# Patient Record
Sex: MALE | NOT HISPANIC OR LATINO | Employment: FULL TIME | URBAN - NONMETROPOLITAN AREA
[De-identification: names, ages, dates, MRNs, and addresses within clinical notes are randomized per-mention and may not be internally consistent; named-entity substitution may affect disease eponyms.]

---

## 2019-06-21 ENCOUNTER — OFFICE VISIT (OUTPATIENT)
Dept: PHARMACY | Facility: HOSPITAL | Age: 24
End: 2019-06-21

## 2019-06-21 VITALS
SYSTOLIC BLOOD PRESSURE: 112 MMHG | HEART RATE: 55 BPM | OXYGEN SATURATION: 100 % | WEIGHT: 182.4 LBS | BODY MASS INDEX: 30.39 KG/M2 | DIASTOLIC BLOOD PRESSURE: 70 MMHG | HEIGHT: 65 IN

## 2019-06-21 DIAGNOSIS — B18.2 CHRONIC HEPATITIS C WITHOUT HEPATIC COMA (HCC): Primary | ICD-10-CM

## 2019-06-21 DIAGNOSIS — F19.91 HISTORY OF DRUG USE: ICD-10-CM

## 2019-06-21 LAB
ALBUMIN SERPL-MCNC: 4.8 G/DL (ref 3.5–5.2)
ALBUMIN/GLOB SERPL: 2.1 G/DL
ALP SERPL-CCNC: 62 U/L (ref 39–117)
ALPHA-FETOPROTEIN: 2.61 NG/ML (ref 0–8.3)
ALT SERPL W P-5'-P-CCNC: 109 U/L (ref 1–41)
ANION GAP SERPL CALCULATED.3IONS-SCNC: 14.7 MMOL/L
AST SERPL-CCNC: 58 U/L (ref 1–40)
BILIRUB SERPL-MCNC: 0.5 MG/DL (ref 0.2–1.2)
BUN BLD-MCNC: 12 MG/DL (ref 6–20)
BUN/CREAT SERPL: 17.1 (ref 7–25)
CALCIUM SPEC-SCNC: 9.3 MG/DL (ref 8.6–10.5)
CHLORIDE SERPL-SCNC: 98 MMOL/L (ref 98–107)
CO2 SERPL-SCNC: 28.3 MMOL/L (ref 22–29)
CREAT BLD-MCNC: 0.7 MG/DL (ref 0.76–1.27)
DEPRECATED RDW RBC AUTO: 38.7 FL (ref 37–54)
ERYTHROCYTE [DISTWIDTH] IN BLOOD BY AUTOMATED COUNT: 11.4 % (ref 12.3–15.4)
GFR SERPL CREATININE-BSD FRML MDRD: 136 ML/MIN/1.73
GFR SERPL CREATININE-BSD FRML MDRD: >150 ML/MIN/1.73
GLOBULIN UR ELPH-MCNC: 2.3 GM/DL
GLUCOSE BLD-MCNC: 77 MG/DL (ref 65–99)
HBV SURFACE AB SER RIA-ACNC: NORMAL
HCT VFR BLD AUTO: 47.9 % (ref 37.5–51)
HGB BLD-MCNC: 15.4 G/DL (ref 13–17.7)
INR PPP: 0.91 (ref 0.9–1.1)
MCH RBC QN AUTO: 29.6 PG (ref 26.6–33)
MCHC RBC AUTO-ENTMCNC: 32.2 G/DL (ref 31.5–35.7)
MCV RBC AUTO: 92.1 FL (ref 79–97)
PLATELET # BLD AUTO: 217 10*3/MM3 (ref 140–450)
PMV BLD AUTO: 10.8 FL (ref 6–12)
POTASSIUM BLD-SCNC: 4.4 MMOL/L (ref 3.5–5.2)
PROT SERPL-MCNC: 7.1 G/DL (ref 6–8.5)
PROTHROMBIN TIME: 12.8 SECONDS (ref 11–15.4)
RBC # BLD AUTO: 5.2 10*6/MM3 (ref 4.14–5.8)
SODIUM BLD-SCNC: 141 MMOL/L (ref 136–145)
WBC NRBC COR # BLD: 7.97 10*3/MM3 (ref 3.4–10.8)

## 2019-06-21 PROCEDURE — 86704 HEP B CORE ANTIBODY TOTAL: CPT

## 2019-06-21 PROCEDURE — 85027 COMPLETE CBC AUTOMATED: CPT

## 2019-06-21 PROCEDURE — 87902 NFCT AGT GNTYP ALYS HEP C: CPT

## 2019-06-21 PROCEDURE — 80307 DRUG TEST PRSMV CHEM ANLYZR: CPT

## 2019-06-21 PROCEDURE — 36415 COLL VENOUS BLD VENIPUNCTURE: CPT

## 2019-06-21 PROCEDURE — 86706 HEP B SURFACE ANTIBODY: CPT

## 2019-06-21 PROCEDURE — 99243 OFF/OP CNSLTJ NEW/EST LOW 30: CPT | Performed by: PHYSICIAN ASSISTANT

## 2019-06-21 PROCEDURE — 80053 COMPREHEN METABOLIC PANEL: CPT

## 2019-06-21 PROCEDURE — G0483 DRUG TEST DEF 22+ CLASSES: HCPCS

## 2019-06-21 PROCEDURE — 86708 HEPATITIS A ANTIBODY: CPT

## 2019-06-21 PROCEDURE — 82105 ALPHA-FETOPROTEIN SERUM: CPT

## 2019-06-21 PROCEDURE — 85610 PROTHROMBIN TIME: CPT

## 2019-06-21 PROCEDURE — 81596 NFCT DS CHRNC HCV 6 ASSAYS: CPT

## 2019-06-21 PROCEDURE — 87522 HEPATITIS C REVRS TRNSCRPJ: CPT

## 2019-06-21 NOTE — PROGRESS NOTES
: 1995    Chief Complaint   Patient presents with   • Hepatitis C       Thien Covarrubias is a 24 y.o. male who presents to the office today as a consultation from Oli PATTERSON MD for evaluation of Hepatitis C.    History of Present Illness:  He reports recently finding out about having Hepatitis C infection. He is unsure how long he has had it. He has not used IV drugs for 9 years. He had a needle stick from another drug user even after he stopped using drugs. No current illicit drug use including marijuana. He has not smoked marijuana in over 10 years. No current alcohol use and no previous daily alcohol use. He does have a non-professional tattoo. His father had Hep C and was cured but no known family history of cirrhosis or liver disease. No recent liver imaging. He recently had dental procedure approx 1 week ago in [place] and his dentist gave hydrocodone for pain after. He feels that the left side of his face is larger than the right but this has been present for years.     Labs 2019:  Acute hepatitis panel pos Hep C only  AST 46  ALT 91  HIV negative   Cholesterol normal  Plt 250,000  H/H normal  GFR normal  TSH normal    Review of Systems   Constitutional: Negative for chills, fatigue and fever.   HENT: Negative for trouble swallowing.    Eyes: Negative.    Respiratory: Negative for cough, choking, chest tightness and shortness of breath.    Cardiovascular: Positive for chest pain.   Gastrointestinal: Negative for abdominal distention, abdominal pain, anal bleeding, blood in stool, constipation, diarrhea, nausea and vomiting.   Endocrine: Negative.    Genitourinary: Negative for difficulty urinating.   Musculoskeletal: Negative for back pain and neck pain.   Skin: Positive for rash. Negative for color change, pallor and wound.   Allergic/Immunologic: Negative for environmental allergies and food allergies.   Neurological: Negative for dizziness, light-headedness and headaches.  "  Hematological: Does not bruise/bleed easily.   Psychiatric/Behavioral: The patient is nervous/anxious.        Past Medical History:   Diagnosis Date   • Infectious viral hepatitis        Past Surgical History:   Procedure Laterality Date   • TONSILLECTOMY AND ADENOIDECTOMY         Family History   Problem Relation Age of Onset   • Hearing loss Father        Social History     Socioeconomic History   • Marital status: Unknown     Spouse name: Not on file   • Number of children: Not on file   • Years of education: Not on file   • Highest education level: Not on file   Tobacco Use   • Smoking status: Never Smoker   • Smokeless tobacco: Former User   Substance and Sexual Activity   • Alcohol use: No     Frequency: Never   • Drug use: No   • Sexual activity: Defer     Current Medications:  None    Allergies:   Patient has no known allergies.    Vitals:  /70 (BP Location: Left arm, Patient Position: Sitting, Cuff Size: Adult)   Pulse 55   Ht 165.1 cm (65\")   Wt 82.7 kg (182 lb 6.4 oz)   SpO2 100%   BMI 30.35 kg/m²     Physical Exam   Constitutional: He is oriented to person, place, and time. He appears well-developed and well-nourished. No distress.   HENT:   Head: Normocephalic and atraumatic.   Nose: Nose normal.   Mouth/Throat: Oropharynx is clear and moist.   Left side of face > right. Poor dentition.   Eyes: Conjunctivae are normal. Right eye exhibits no discharge. Left eye exhibits no discharge. No scleral icterus.   Neck: Normal range of motion. No JVD present.   Cardiovascular: Normal rate, regular rhythm and normal heart sounds. Exam reveals no gallop and no friction rub.   No murmur heard.  Pulmonary/Chest: Effort normal and breath sounds normal. No respiratory distress. He has no wheezes. He has no rales. He exhibits no tenderness.   Abdominal: Soft. Bowel sounds are normal. He exhibits no mass. There is no tenderness.   ticklish   Musculoskeletal: Normal range of motion. He exhibits no edema or " deformity.   Neurological: He is alert and oriented to person, place, and time. Coordination normal.   Skin: Skin is warm and dry. No rash noted. He is not diaphoretic. No erythema.   Psychiatric: He has a normal mood and affect. His behavior is normal. Judgment and thought content normal.   Vitals reviewed.    Assessment/Plan:  1. Chronic hepatitis C without hepatic coma (CMS/HCC)    2. History of drug use      Orders Placed This Encounter   Procedures   • US Liver   • AFP Tumor Marker   • CBC (No Diff)   • Comprehensive Metabolic Panel   • HCV FibroSURE   • HCV RNA By PCR, Qn Rfx Kae   • Hepatitis A Antibody, Total   • Hepatitis B Core Antibody, Total   • Hepatitis B Surface Antibody   • Protime-INR   • Compliance Drug Analysis, Ur - Urine, Clean Catch     More recommendations will be made after these results have been reviewed. He will continue to abstain from alcohol and illegal drugs. He will have US liver to determine if any lesions or other liver diseases present. Immunity to Hep A and B will be determined and vaccinations recommended if needed. If drug and alcohol screen negative, then we will proceed with Hep C therapy and will submit Rx to insurance company for approval. Treatment will depend on fibrosis score and Hep C genotype. When approved, he will  Rx from our pharmacy and have another appointment with me. Hep C viral load lab will be checked 4 weeks after start of therapy, at end of therapy and 3 months s/p therapy to determine response to treatment and cure. He will call with concerns.     [facility] reviewed and confirmed opioid script from dentist recently.           Follow up will be dependent on lab results.       Electronically signed 6/21/2019 1:36 PM  Namrata Webb PA-C, [facility]

## 2019-06-22 LAB
HAV AB SER QL IA: POSITIVE
HBV CORE AB SER DONR QL IA: NEGATIVE

## 2019-06-23 LAB
HCV GENTYP SERPL NAA+PROBE: NORMAL
HCV GENTYP SERPL NAA+PROBE: NORMAL
HCV RNA SERPL NAA+PROBE-ACNC: NORMAL IU/ML
HCV RNA SERPL NAA+PROBE-LOG IU: 5.9 LOG10 IU/ML
Lab: NORMAL
REF LAB TEST REF RANGE: NORMAL

## 2019-06-25 LAB
A2 MACROGLOB SERPL-MCNC: 149 MG/DL (ref 110–276)
ALT SERPL W P-5'-P-CCNC: 129 IU/L (ref 0–55)
APO A-I SERPL-MCNC: 136 MG/DL (ref 101–178)
BILIRUB SERPL-MCNC: 0.4 MG/DL (ref 0–1.2)
FIBROSIS SCORING:: ABNORMAL
FIBROSIS STAGE SERPL QL: ABNORMAL
GGT SERPL-CCNC: 54 IU/L (ref 0–65)
HAPTOGLOB SERPL-MCNC: 103 MG/DL (ref 34–200)
HCV AB SER QL: ABNORMAL
LABORATORY COMMENT REPORT: ABNORMAL
LIMITATIONS:: ABNORMAL
LIVER FIBR SCORE SERPL CALC.FIBROSURE: 0.11 (ref 0–0.21)
NECROINFLAMM ACTIVITY SCORING:: ABNORMAL
NECROINFLAMMATORY ACT GRADE SERPL QL: ABNORMAL
NECROINFLAMMATORY ACT SCORE SERPL: 0.6 (ref 0–0.17)

## 2019-06-27 LAB — CONV REPORT SUMMARY: NORMAL

## 2019-06-28 ENCOUNTER — TELEPHONE (OUTPATIENT)
Dept: GASTROENTEROLOGY | Facility: CLINIC | Age: 24
End: 2019-06-28

## 2019-06-28 ENCOUNTER — HOSPITAL ENCOUNTER (OUTPATIENT)
Dept: ULTRASOUND IMAGING | Facility: HOSPITAL | Age: 24
Discharge: HOME | End: 2019-06-28
Admitting: PHYSICIAN ASSISTANT

## 2019-06-28 DIAGNOSIS — F19.91 HISTORY OF DRUG USE: ICD-10-CM

## 2019-06-28 DIAGNOSIS — B18.2 CHRONIC HEPATITIS C WITHOUT HEPATIC COMA (HCC): ICD-10-CM

## 2019-06-28 PROCEDURE — 76705 ECHO EXAM OF ABDOMEN: CPT | Performed by: RADIOLOGY

## 2019-06-28 PROCEDURE — 76705 ECHO EXAM OF ABDOMEN: CPT

## 2019-07-19 ENCOUNTER — TELEPHONE (OUTPATIENT)
Dept: GASTROENTEROLOGY | Facility: CLINIC | Age: 24
End: 2019-07-19

## 2019-07-19 ENCOUNTER — OFFICE VISIT (OUTPATIENT)
Dept: PHARMACY | Facility: HOSPITAL | Age: 24
End: 2019-07-19

## 2019-07-19 VITALS
HEIGHT: 68 IN | SYSTOLIC BLOOD PRESSURE: 108 MMHG | WEIGHT: 184 LBS | BODY MASS INDEX: 27.89 KG/M2 | HEART RATE: 57 BPM | RESPIRATION RATE: 18 BRPM | OXYGEN SATURATION: 98 % | DIASTOLIC BLOOD PRESSURE: 64 MMHG

## 2019-07-19 DIAGNOSIS — B18.2 CHRONIC HEPATITIS C WITHOUT HEPATIC COMA (HCC): Primary | ICD-10-CM

## 2019-07-19 DIAGNOSIS — Z23 NEED FOR HEPATITIS B VACCINATION: ICD-10-CM

## 2019-07-19 PROCEDURE — 99214 OFFICE O/P EST MOD 30 MIN: CPT | Performed by: PHYSICIAN ASSISTANT

## 2019-07-19 NOTE — TELEPHONE ENCOUNTER
Pt has Hep C, genotype 1a, treatment naive, without cirrhosis (F0 fibrosis score). He needs Hep B vaccination, he will get this on his own. I have prescribed Mavyret for tx.

## 2019-07-19 NOTE — PROGRESS NOTES
: 1995    Chief Complaint   Patient presents with   • Hepatitis C       Thien Covarrubias is a 24 y.o. male who presents to the office today as a follow up appointment regarding Hepatitis C.    History of Present Illness:  He denies any new problems since recent office visit. He had labs and imaging as ordered for consideration of Hep C therapy and would like to discuss those results today. He found out about having Hep C approx 2019. He is unsure how long he has had it. He has not used IV drugs for 9 years. He had a needle stick from another drug user even after he stopped using drugs. No current illicit drug use including marijuana. He has not smoked marijuana in over 10 years. No current alcohol use and no previous daily alcohol use. He does have a non-professional tattoo. His father had Hep C and was cured but no known family history of cirrhosis or liver disease. No recent liver imaging. He recently had dental procedure approx 1 month ago in [place] and his dentist gave hydrocodone for pain after. He feels that the left side of his face is larger than the right but this has been present for years.      Review of Systems   Constitutional: Negative for chills, fatigue and fever.   HENT: Negative for trouble swallowing.    Eyes: Negative.    Respiratory: Negative for cough, choking, chest tightness and shortness of breath.    Cardiovascular: Positive for chest pain.   Gastrointestinal: Negative for abdominal distention, abdominal pain, anal bleeding, blood in stool, constipation, diarrhea, nausea and vomiting.   Endocrine: Negative.    Genitourinary: Negative for difficulty urinating.   Musculoskeletal: Negative for back pain and neck pain.   Skin: Positive for rash. Negative for color change, pallor and wound.   Allergic/Immunologic: Negative for environmental allergies and food allergies.   Neurological: Negative for dizziness, light-headedness and headaches.   Hematological: Does not bruise/bleed  "easily.   Psychiatric/Behavioral: The patient is nervous/anxious.        Past Medical History:   Diagnosis Date   • Infectious viral hepatitis        Past Surgical History:   Procedure Laterality Date   • TONSILLECTOMY AND ADENOIDECTOMY         Family History   Problem Relation Age of Onset   • Hearing loss Father        Social History     Socioeconomic History   • Marital status: Single     Spouse name: Not on file   • Number of children: Not on file   • Years of education: Not on file   • Highest education level: Not on file   Tobacco Use   • Smoking status: Never Smoker   • Smokeless tobacco: Former User   Substance and Sexual Activity   • Alcohol use: No     Frequency: Never   • Drug use: No   • Sexual activity: Defer     Current Medications:  None    Allergies:   Patient has no known allergies.    Vitals:  /64 (BP Location: Right arm, Patient Position: Sitting)   Pulse 57   Resp 18   Ht 172.7 cm (68\")   Wt 83.5 kg (184 lb)   SpO2 98%   BMI 27.98 kg/m²     Physical Exam   Constitutional: He is oriented to person, place, and time. He appears well-developed and well-nourished. No distress.   HENT:   Head: Normocephalic and atraumatic.   Nose: Nose normal.   Mouth/Throat: Oropharynx is clear and moist.   Left side of face > right. Poor dentition.   Eyes: Conjunctivae are normal. Right eye exhibits no discharge. Left eye exhibits no discharge. No scleral icterus.   Neck: Normal range of motion. No JVD present.   Cardiovascular: Normal rate, regular rhythm and normal heart sounds. Exam reveals no gallop and no friction rub.   No murmur heard.  Pulmonary/Chest: Effort normal and breath sounds normal. No respiratory distress. He has no wheezes. He has no rales. He exhibits no tenderness.   Abdominal: Soft. Bowel sounds are normal. He exhibits no mass. There is no tenderness.   ticklish   Musculoskeletal: Normal range of motion. He exhibits no edema or deformity.   Neurological: He is alert and oriented to " person, place, and time. Coordination normal.   Skin: Skin is warm and dry. No rash noted. He is not diaphoretic. No erythema.   Psychiatric: He has a normal mood and affect. His behavior is normal. Judgment and thought content normal.   Vitals reviewed.    Results Review:  Ultrasound liver 6/28/2019 normal    Labs 6/21/2019: Hepatitis C genotype 1a, AFP normal, CBC normal, , AST 58, alk phos 62, total bilirubin 0.5, , creatinine 0.70, F0 fibrosis score, A2 inflammation, hep C viral load 790,000, hepatitis A total antibody positive, hepatitis B core total antibody negative, hepatitis B surface antibody negative, INR normal, drug screen negative.    Labs 5/2019:  Acute hepatitis panel pos Hep C only  AST 46  ALT 91  HIV negative   Cholesterol normal  Plt 250,000  H/H normal  GFR normal  TSH normal    Assessment/Plan:  1. Chronic hepatitis C without hepatic coma (CMS/HCC)    2. Need for hepatitis B vaccination      Will start treatment for hepatitis C, genotype 1a, treatment naïve without cirrhosis with Mavyret as directed.  Take this medication daily and not missed any doses.  He will call with any concerns.  He will have labs 4 weeks after starting medication to monitor response to therapy.  He will also have labs at completion of therapy and 3-month status post therapy for confirmation of cure.  Continue abstinence from alcohol and illicit drugs.    Series of immunizations for Hepatitis B should be obtained and have been ordered today. Immunity is important to prevent further insult to his liver. Information regarding locations that these can be performed has been expressed.    Rx- Mavyret, Glecaprevir-Pibrentasvir 100-MG tablet, tabs p.o. once daily.  Dispense 84 tablets.  Refills 1.        Follow up 4 weeks after starting Hep C therapy.      Electronically signed 7/31/2019 2:59 PM  Namrata Webb PA-C, [facility]

## 2019-07-22 ENCOUNTER — SPECIALTY PHARMACY (OUTPATIENT)
Dept: PHARMACY | Facility: HOSPITAL | Age: 24
End: 2019-07-22

## 2019-07-22 DIAGNOSIS — B18.2 CHRONIC HEPATITIS C WITHOUT HEPATIC COMA (HCC): ICD-10-CM

## 2019-07-22 RX ORDER — VELPATASVIR AND SOFOSBUVIR 100; 400 MG/1; MG/1
1 TABLET, FILM COATED ORAL DAILY
Qty: 28 TABLET | Refills: 2 | Status: SHIPPED | OUTPATIENT
Start: 2019-07-22 | End: 2019-09-16

## 2019-09-16 ENCOUNTER — OFFICE VISIT (OUTPATIENT)
Dept: UROLOGY | Facility: CLINIC | Age: 24
End: 2019-09-16

## 2019-09-16 VITALS
TEMPERATURE: 97.6 F | SYSTOLIC BLOOD PRESSURE: 118 MMHG | BODY MASS INDEX: 28.79 KG/M2 | HEIGHT: 68 IN | DIASTOLIC BLOOD PRESSURE: 68 MMHG | WEIGHT: 190 LBS

## 2019-09-16 DIAGNOSIS — Z98.52 VASECTOMY STATUS: Primary | ICD-10-CM

## 2019-09-16 PROCEDURE — 99203 OFFICE O/P NEW LOW 30 MIN: CPT | Performed by: UROLOGY

## 2019-09-16 RX ORDER — HYDROCODONE BITARTRATE AND ACETAMINOPHEN 5; 325 MG/1; MG/1
TABLET ORAL
Refills: 0 | COMMUNITY
Start: 2019-06-13 | End: 2019-09-16

## 2019-09-16 RX ORDER — DIAZEPAM 10 MG/1
TABLET ORAL
Qty: 2 TABLET | Refills: 0 | Status: SHIPPED | OUTPATIENT
Start: 2019-09-16 | End: 2019-11-18

## 2019-09-16 RX ORDER — CEPHALEXIN 500 MG/1
500 CAPSULE ORAL 2 TIMES DAILY
Qty: 8 CAPSULE | Refills: 0 | Status: SHIPPED | OUTPATIENT
Start: 2019-09-16 | End: 2019-09-20

## 2019-09-16 NOTE — PROGRESS NOTES
Chief Complaint:          Chief Complaint   Patient presents with   • Vasectomy Consult     New pt       HPI:   24 y.o. male.  Currently has 3 children and his wife is pregnant is desirous of an elective scrotal vasectomy he has no contraindications.  Patient presents for consideration of an elective scrotal vasectomy.  I discussed the procedure at length.  I discussed the fact that it has a risk of anesthesia, bleeding, infection, testicular pain postoperatively, a failure in the range of 1 in 10,000.  The important necessity to have a follow-up in about 8 weeks after the original procedure to be sure that the semen specimen is free of spermatozoa thus ensuring sterility.  I discussed the various forms out there including a 1 incision, 2 incision technique as well as the after extensive discussion they wish to proceed    Past Medical History:        Past Medical History:   Diagnosis Date   • Infectious viral hepatitis          Current Meds:     No current outpatient medications on file.     No current facility-administered medications for this visit.         Allergies:      No Known Allergies     Past Surgical History:     Past Surgical History:   Procedure Laterality Date   • TONSILLECTOMY AND ADENOIDECTOMY           Social History:     Social History     Socioeconomic History   • Marital status: Single     Spouse name: Not on file   • Number of children: Not on file   • Years of education: Not on file   • Highest education level: Not on file   Tobacco Use   • Smoking status: Never Smoker   • Smokeless tobacco: Former User   Substance and Sexual Activity   • Alcohol use: No     Frequency: Never   • Drug use: No   • Sexual activity: Defer       Family History:     Family History   Problem Relation Age of Onset   • Hearing loss Father    • No Known Problems Mother        Review of Systems:     Review of Systems   Constitutional: Negative.    HENT: Negative.    Eyes: Negative.    Respiratory: Negative.     Cardiovascular: Negative.    Gastrointestinal: Negative.    Endocrine: Negative.    Musculoskeletal: Negative.    Allergic/Immunologic: Negative.    Neurological: Negative.    Hematological: Negative.    Psychiatric/Behavioral: Negative.        Physical Exam:     Physical Exam   Constitutional: He is oriented to person, place, and time. He appears well-developed and well-nourished.   HENT:   Head: Normocephalic and atraumatic.   Eyes: Conjunctivae and EOM are normal. Pupils are equal, round, and reactive to light.   Neck: Normal range of motion.   Cardiovascular: Normal rate, regular rhythm, normal heart sounds and intact distal pulses.   Pulmonary/Chest: Effort normal and breath sounds normal.   Abdominal: Soft. Bowel sounds are normal.   Genitourinary: Penis normal.   Genitourinary Comments: Normal scrotal sac   Musculoskeletal: Normal range of motion.   Neurological: He is alert and oriented to person, place, and time. He has normal reflexes.   Skin: Skin is warm and dry.   Psychiatric: He has a normal mood and affect. His behavior is normal. Judgment and thought content normal.   Nursing note and vitals reviewed.      I have reviewed the following portions of the patient's history: allergies, current medications, past family history, past medical history, past social history, past surgical history, problem list and ROS and confirm it's accurate.      Procedure:       Assessment/Plan:   Vasectomy status-patient presents for consideration of an elective scrotal vasectomy.  I discussed the procedure at length.  I discussed the fact that it has a risk of anesthesia, bleeding, infection, testicular pain postoperatively, a failure in the range of 1 in 10,000.  The important necessity to have a follow-up in about 8 weeks after the original procedure to be sure that the semen specimen is free of spermatozoa thus ensuring sterility.  I discussed the various forms out there including a 1 incision, 2 incision technique  as well as the after extensive discussion they wish to proceed            Patient's Body mass index is 28.89 kg/m². BMI is above normal parameters. Recommendations include: educational material.              This document has been electronically signed by GEORGIA BECKHAM MD September 16, 2019 2:19 PM

## 2019-09-17 PROBLEM — Z98.52 VASECTOMY STATUS: Status: ACTIVE | Noted: 2019-09-17

## 2019-10-15 ENCOUNTER — PRIOR AUTHORIZATION (OUTPATIENT)
Dept: UROLOGY | Facility: CLINIC | Age: 24
End: 2019-10-15

## 2019-10-22 ENCOUNTER — OFFICE VISIT (OUTPATIENT)
Dept: UROLOGY | Facility: CLINIC | Age: 24
End: 2019-10-22

## 2019-10-22 VITALS
DIASTOLIC BLOOD PRESSURE: 68 MMHG | BODY MASS INDEX: 28.79 KG/M2 | SYSTOLIC BLOOD PRESSURE: 124 MMHG | WEIGHT: 190 LBS | HEIGHT: 68 IN

## 2019-10-22 DIAGNOSIS — Z98.52 VASECTOMY STATUS: Primary | ICD-10-CM

## 2019-10-22 PROCEDURE — 55250 REMOVAL OF SPERM DUCT(S): CPT | Performed by: UROLOGY

## 2019-10-22 RX ORDER — HYDROCODONE BITARTRATE AND ACETAMINOPHEN 10; 325 MG/1; MG/1
1 TABLET ORAL EVERY 6 HOURS PRN
Qty: 10 TABLET | Refills: 0 | Status: SHIPPED | OUTPATIENT
Start: 2019-10-22 | End: 2019-11-18

## 2019-10-22 NOTE — PROGRESS NOTES
Chief Complaint:          Chief Complaint   Patient presents with   • Sterilization       HPI:   24 y.o. male. Currently has 3 children and his wife is pregnant is desirous of an elective scrotal vasectomy he has no contraindications.  Patient presents for consideration of an elective scrotal vasectomy.  I discussed the procedure at length.  I discussed the fact that it has a risk of anesthesia, bleeding, infection, testicular pain postoperatively, a failure in the range of 1 in 10,000.  The important necessity to have a follow-up in about 8 weeks after the original procedure to be sure that the semen specimen is free of spermatozoa thus ensuring sterility.  I discussed the various forms out there including a 1 incision, 2 incision technique as well as the after extensive discussion they wish to proceed.      Past Medical History:        Past Medical History:   Diagnosis Date   • Infectious viral hepatitis          Current Meds:     Current Outpatient Medications   Medication Sig Dispense Refill   • diazePAM (VALIUM) 10 MG tablet Use one tablet night before procedure at bedtime and morning of prior to procedure 2 tablet 0     No current facility-administered medications for this visit.         Allergies:      No Known Allergies     Past Surgical History:     Past Surgical History:   Procedure Laterality Date   • TONSILLECTOMY AND ADENOIDECTOMY           Social History:     Social History     Socioeconomic History   • Marital status: Single     Spouse name: Not on file   • Number of children: Not on file   • Years of education: Not on file   • Highest education level: Not on file   Tobacco Use   • Smoking status: Never Smoker   • Smokeless tobacco: Current User     Types: Chew   Substance and Sexual Activity   • Alcohol use: No     Frequency: Never   • Drug use: No   • Sexual activity: Defer       Family History:     Family History   Problem Relation Age of Onset   • Hearing loss Father    • No Known Problems Mother         Review of Systems:     Review of Systems   Constitutional: Negative.    HENT: Negative.    Eyes: Negative.    Respiratory: Negative.    Cardiovascular: Negative.    Gastrointestinal: Negative.    Endocrine: Negative.    Musculoskeletal: Negative.    Allergic/Immunologic: Negative.    Neurological: Negative.    Hematological: Negative.    Psychiatric/Behavioral: Negative.        Physical Exam:     Physical Exam   Constitutional: He is oriented to person, place, and time. He appears well-developed and well-nourished.   HENT:   Head: Normocephalic and atraumatic.   Eyes: Conjunctivae and EOM are normal. Pupils are equal, round, and reactive to light.   Neck: Normal range of motion.   Cardiovascular: Normal rate, regular rhythm, normal heart sounds and intact distal pulses.   Pulmonary/Chest: Effort normal and breath sounds normal.   Abdominal: Soft. Bowel sounds are normal.   Musculoskeletal: Normal range of motion.   Neurological: He is alert and oriented to person, place, and time. He has normal reflexes.   Skin: Skin is warm and dry.   Psychiatric: He has a normal mood and affect. His behavior is normal. Judgment and thought content normal.   Nursing note and vitals reviewed.      I have reviewed the following portions of the patient's history: allergies, current medications, past family history, past medical history, past social history, past surgical history, problem list and ROS and confirm it's accurate.      Procedure:   Elective scrotal vasectomy -After an appropriate informed consent including the risk of anesthesia, infection, scrotal hematoma, failure in the range of 1 in 10,000, chronic testalgia,  low testosterone, as well as the other very rare complications identified.  He was brought to the operative suite his scrotum was shaved and prepped in a sterile fashion using Betadine.  Local anesthetic was infiltrated into the midline scrotal raphae. A midline scrotal incision was made and the right vas  entrapped.  I anesthetized the spermatic cord and skin using 5 cc of 1% Xylocaine with epinephrine after an adequate period of analgesia I  identified the vas and brought out into the incision.  The fascia was divided.  The vas was then doubly ligated fulgurated and the and was sewn in the sheath away from the proximal end.  Bovie electrocautery had been used to fulgurate the end of both vases as per the AUA guidelines Hemostasis was excellent and it was allowed to fall back in the scrotal sac the identical procedure was done on the contralateral side.  The skin was closed with a 3-0 chromic sutures.  Hemostasis was excellent he was recommended to use ice pack with a shield covering the scrotum to protect it from the ice.  He was given pain medication and antibiotics.  The incision was covered with bacitracin ointment.  The patient will be seen in 8 weeks whereupon we will then check a semen analysis to confirm the absence of spermatozoa and therby declare sterility.  Vasectomy status    Assessment/Plan:   Vasectomy status-await semen results  Narcotic pain medication-patient has significant acute pain that I believe would be an indication for the use of narcotic pain medication.  I discussed the significant risks of pain medication and the fact that this will be a short only option and I will give her no more than a three-day supply of pain medication.  And I will not plan long-term medication and that this will be sent to a pain clinic if at all becomes necessary.  We discussed signing a pain medication agreement and the fact that we're going to run a state [facility] review to be sure the patient is not getting pain medication from elsewhere.  If this is the case we will not give pain medication.  As part of the patient's treatment plan of there being prescribed a controlled substance with potential for abuse.  This patient has been wait aware of the appropriate dose of such medications including, the risk for  somnolence, limited ability to drive and/or safety and the significant potential for overdose.  It is been made clear that these medications are for the prescribed patient only without concomitant use of alcohol or other sepsis unless prescribed by the medical provider.  Has completed prescribing agreement detailing the terms of continue prescribing him a controlled substance.  Including monitoring [place] ports, the possibility of urine drug screens and pedal counts.  The patient is aware that we reviewed [facility] reports on a regular basis and scan them into the chart.  History and physical examination exhibited continued safe and appropriate use of controlled substances.  Also discussed the fact that the new [place] legislation allows only a three-day prescription for pain medication.  In this situation he will be referred to a chronic pain clinic.          Patient's Body mass index is 28.9 kg/m². BMI is above normal parameters. Recommendations include: educational material.              This document has been electronically signed by GEORGIA BECKHAM MD October 22, 2019 8:30 AM

## 2019-10-22 NOTE — PATIENT INSTRUCTIONS
Vasectomy, Care After  Refer to this sheet in the next few weeks. These instructions provide you with information on caring for yourself after your procedure. Your health care provider may also give you more specific instructions. Your treatment has been planned according to current medical practices, but problems sometimes occur. Call your health care provider if you have any problems or questions after your procedure.  What can I expect after the procedure?  After your procedure, it is typical to have the following:  · Slight swelling or redness or both at the surgical site.  · Mild pain or discomfort in the scrotum.  · Some oozing of blood from the cuts (incisions) made by the surgeon is normal during the first day or two after the procedure.  · Blood in the ejaculate is common and typically clears after a few days.    Follow these instructions at home:  · Only take over-the-counter or prescription medicines for pain, discomfort, or fever as directed by your health care provider.  · Avoid using nonsteroidal anti-inflammatory drugs (NSAIDs) because these can make bleeding worse.  · Apply ice to the injured area:  ? Put ice in a plastic bag.  ? Place a towel between your skin and the bag.  ? Leave the ice on for 20 minutes, 2-3 times a day.  · Avoid being active for the first 2 days after surgery.  · Wear a supporter while moving around for the first week after surgery. You may add some sterile fluffed bandages or a clean washcloth to the scrotal support if the scrotal support irritates your skin.  · Do not participate in sports or perform heavy physical labor for at least 2 weeks.  · You may have protected intercourse 7-10 days after your procedure. Remember, you are not sterile until follow-up specimens show no sperm in your ejaculate.  · Be sure to follow up with your surgeon as instructed to confirm sterility. It usually requires multiple ejaculations to clear the sperm located beyond the vasectomy site of  blockage. You will need at least two specimens showing an absence of sperm before you can resume unprotected intercourse.  Contact a health care provider if:  · You have redness, swelling, or increasing pain in the wounds or testicles (scrotum).  · You see pus coming from the wound.  · You have a fever.  · You notice a foul smell coming from the wound or dressing.  · You notice a breaking open of the stitches (suture) line or wound edges even after sutures have been removed.  · You have increased bleeding from the wounds.  Get help right away if:  · You develop a rash.  · You have difficulty breathing.  · You have any reaction or side effects to medicines given.  This information is not intended to replace advice given to you by your health care provider. Make sure you discuss any questions you have with your health care provider.  Document Released: 07/07/2006 Document Revised: 05/25/2017 Document Reviewed: 07/07/2014  [employer] Interactive Patient Education © 2018 [employer]

## 2019-10-24 ENCOUNTER — TELEPHONE (OUTPATIENT)
Dept: UROLOGY | Facility: CLINIC | Age: 24
End: 2019-10-24

## 2019-10-24 NOTE — TELEPHONE ENCOUNTER
The patient called reporting some pelvic pain and what he referred to as a like a blood blister underneath the suture site. I discussed this with Elizabet because she assisted this procedure, she states that he was really tense and nervous during the procedure. She suspects that this is all normal and the procedure was routine with no complications. The patient has been notified to just keep watching and to let us know if he were to get a fever, chills, shakey, scrotal swelling, or intense pain in that region. He states to understand.    Thanks,  Kristine Dudley,CMA

## 2019-10-28 ENCOUNTER — TELEPHONE (OUTPATIENT)
Dept: UROLOGY | Facility: CLINIC | Age: 24
End: 2019-10-28

## 2019-10-28 NOTE — TELEPHONE ENCOUNTER
Patient called wondering if it was normal that his incision was spotting. Patient stated he has working a lot. Patient said he will call back if he runs a fever. Patient was just worried and needed to ask a few questions.

## 2019-11-18 ENCOUNTER — OFFICE VISIT (OUTPATIENT)
Dept: UROLOGY | Facility: CLINIC | Age: 24
End: 2019-11-18

## 2019-11-18 VITALS — HEIGHT: 68 IN | BODY MASS INDEX: 30.89 KG/M2 | WEIGHT: 203.8 LBS

## 2019-11-18 DIAGNOSIS — Z98.52 VASECTOMY STATUS: Primary | ICD-10-CM

## 2019-11-18 PROCEDURE — 99024 POSTOP FOLLOW-UP VISIT: CPT | Performed by: UROLOGY

## 2019-11-18 RX ORDER — CEPHALEXIN 500 MG/1
500 CAPSULE ORAL 2 TIMES DAILY
Qty: 20 CAPSULE | Refills: 0 | Status: SHIPPED | OUTPATIENT
Start: 2019-11-18 | End: 2019-11-28

## 2019-11-18 RX ORDER — HYDROCODONE BITARTRATE AND ACETAMINOPHEN 10; 325 MG/1; MG/1
1 TABLET ORAL EVERY 6 HOURS PRN
Qty: 15 TABLET | Refills: 0 | Status: SHIPPED | OUTPATIENT
Start: 2019-11-18 | End: 2019-12-17

## 2019-11-18 NOTE — PROGRESS NOTES
Chief Complaint:          Chief Complaint   Patient presents with   • Wound after Vasectomy       HPI:   24 y.o. male.  Who is status post an elective scrotal vasectomy did well his vasectomy was done on October 22 however then developed opening of the incision.  Its opening is got a grayish discharge there is no surrounding erythema.  Clearly, the incision has not sealed I went ahead and identified no other abnormalities the testes were normal I placed him on appropriate antibiotics Keflex for the next 7 days and a recheck in 2 weeks      Past Medical History:        Past Medical History:   Diagnosis Date   • Infectious viral hepatitis          Current Meds:     No current outpatient medications on file.     No current facility-administered medications for this visit.         Allergies:      No Known Allergies     Past Surgical History:     Past Surgical History:   Procedure Laterality Date   • TONSILLECTOMY AND ADENOIDECTOMY           Social History:     Social History     Socioeconomic History   • Marital status: Single     Spouse name: Not on file   • Number of children: Not on file   • Years of education: Not on file   • Highest education level: Not on file   Tobacco Use   • Smoking status: Never Smoker   • Smokeless tobacco: Current User     Types: Chew   Substance and Sexual Activity   • Alcohol use: No     Frequency: Never   • Drug use: No   • Sexual activity: Defer       Family History:     Family History   Problem Relation Age of Onset   • Hearing loss Father    • No Known Problems Mother        Review of Systems:     Review of Systems   Constitutional: Negative.    HENT: Negative.    Eyes: Negative.    Respiratory: Negative.    Cardiovascular: Negative.    Gastrointestinal: Negative.    Endocrine: Negative.    Genitourinary: Positive for scrotal swelling.   Musculoskeletal: Negative.    Allergic/Immunologic: Negative.    Neurological: Negative.    Hematological: Negative.    Psychiatric/Behavioral:  Negative.        Physical Exam:     Physical Exam   Constitutional: He is oriented to person, place, and time. He appears well-developed and well-nourished.   HENT:   Head: Normocephalic and atraumatic.   Eyes: Conjunctivae and EOM are normal. Pupils are equal, round, and reactive to light.   Neck: Normal range of motion.   Cardiovascular: Normal rate, regular rhythm, normal heart sounds and intact distal pulses.   Pulmonary/Chest: Effort normal and breath sounds normal.   Abdominal: Soft. Bowel sounds are normal.   Genitourinary:   Genitourinary Comments: Scrotal vasectomy incision is not healed incision is open.  It has no erythema but some grayish exudate.  I am to start him on appropriate Keflex I will see him back in 2 weeks   Musculoskeletal: Normal range of motion.   Neurological: He is alert and oriented to person, place, and time. He has normal reflexes.   Skin: Skin is warm and dry.   Psychiatric: He has a normal mood and affect. His behavior is normal. Judgment and thought content normal.   Nursing note and vitals reviewed.      I have reviewed the following portions of the patient's history: allergies, current medications, past family history, past medical history, past social history, past surgical history, problem list and ROS and confirm it's accurate.      Procedure:       Assessment/Plan:   Vasectomy status-await semen results  Scrotal incision pending-we will start him on appropriate antibiotics and that the incision closed by secondary intention            Patient's Body mass index is 28.89 kg/m². BMI is above normal parameters. Recommendations include: educational material.              This document has been electronically signed by GEORGIA BECKHAM MD November 18, 2019 1:02 PM

## 2019-12-16 ENCOUNTER — LAB (OUTPATIENT)
Dept: PHARMACY | Facility: HOSPITAL | Age: 24
End: 2019-12-16

## 2019-12-16 DIAGNOSIS — B18.2 CHRONIC HEPATITIS C WITHOUT HEPATIC COMA (HCC): Primary | ICD-10-CM

## 2019-12-16 DIAGNOSIS — B18.2 CHRONIC HEPATITIS C WITHOUT HEPATIC COMA (HCC): ICD-10-CM

## 2019-12-16 LAB
6-ACETYL MORPHINE: NEGATIVE
AMPHET+METHAMPHET UR QL: NEGATIVE
BARBITURATES UR QL SCN: NEGATIVE
BENZODIAZ UR QL SCN: NEGATIVE
BUPRENORPHINE SERPL-MCNC: NEGATIVE NG/ML
CANNABINOIDS SERPL QL: NEGATIVE
COCAINE UR QL: NEGATIVE
METHADONE UR QL SCN: NEGATIVE
OPIATES UR QL: NEGATIVE
OXYCODONE UR QL SCN: NEGATIVE
PCP UR QL SCN: NEGATIVE

## 2019-12-16 PROCEDURE — 80307 DRUG TEST PRSMV CHEM ANLYZR: CPT

## 2019-12-16 PROCEDURE — 36415 COLL VENOUS BLD VENIPUNCTURE: CPT

## 2019-12-16 PROCEDURE — 80053 COMPREHEN METABOLIC PANEL: CPT

## 2019-12-16 PROCEDURE — 87522 HEPATITIS C REVRS TRNSCRPJ: CPT

## 2019-12-17 ENCOUNTER — OFFICE VISIT (OUTPATIENT)
Dept: UROLOGY | Facility: CLINIC | Age: 24
End: 2019-12-17

## 2019-12-17 VITALS — BODY MASS INDEX: 30.62 KG/M2 | HEIGHT: 68 IN | WEIGHT: 202 LBS

## 2019-12-17 DIAGNOSIS — Z98.52 VASECTOMY STATUS: Primary | ICD-10-CM

## 2019-12-17 DIAGNOSIS — N49.2 SCROTAL INFECTION: ICD-10-CM

## 2019-12-17 LAB
ALBUMIN SERPL-MCNC: 4.8 G/DL (ref 3.5–5.2)
ALBUMIN/GLOB SERPL: 1.5 G/DL
ALP SERPL-CCNC: 63 U/L (ref 39–117)
ALT SERPL W P-5'-P-CCNC: 144 U/L (ref 1–41)
ANION GAP SERPL CALCULATED.3IONS-SCNC: 12.6 MMOL/L (ref 5–15)
AST SERPL-CCNC: 63 U/L (ref 1–40)
BILIRUB SERPL-MCNC: 0.7 MG/DL (ref 0.2–1.2)
BUN BLD-MCNC: 13 MG/DL (ref 6–20)
BUN/CREAT SERPL: 16.5 (ref 7–25)
CALCIUM SPEC-SCNC: 9.6 MG/DL (ref 8.6–10.5)
CHLORIDE SERPL-SCNC: 98 MMOL/L (ref 98–107)
CO2 SERPL-SCNC: 30.4 MMOL/L (ref 22–29)
CREAT BLD-MCNC: 0.79 MG/DL (ref 0.76–1.27)
GFR SERPL CREATININE-BSD FRML MDRD: 119 ML/MIN/1.73
GFR SERPL CREATININE-BSD FRML MDRD: 144 ML/MIN/1.73
GLOBULIN UR ELPH-MCNC: 3.1 GM/DL
GLUCOSE BLD-MCNC: 77 MG/DL (ref 65–99)
POTASSIUM BLD-SCNC: 3.8 MMOL/L (ref 3.5–5.2)
PROT SERPL-MCNC: 7.9 G/DL (ref 6–8.5)
SODIUM BLD-SCNC: 141 MMOL/L (ref 136–145)

## 2019-12-17 PROCEDURE — 99024 POSTOP FOLLOW-UP VISIT: CPT | Performed by: UROLOGY

## 2019-12-17 RX ORDER — HYDROCODONE BITARTRATE AND ACETAMINOPHEN 10; 325 MG/1; MG/1
1 TABLET ORAL EVERY 6 HOURS PRN
Qty: 10 TABLET | Refills: 0 | Status: SHIPPED | OUTPATIENT
Start: 2019-12-17 | End: 2020-02-05

## 2019-12-17 NOTE — PROGRESS NOTES
Chief Complaint:          Chief Complaint   Patient presents with   • 8 wk post Vas       HPI:   24 y.o. male returns today after vasectomy.  Still has occasional pain in the area of the incision.  He wanted a refill of pain medication I gave him a few to finish out the treatment is healed nicely he forgot to bring a semen specimen I will see him back on a as needed basis      Past Medical History:        Past Medical History:   Diagnosis Date   • Infectious viral hepatitis          Current Meds:     No current outpatient medications on file.     No current facility-administered medications for this visit.         Allergies:      No Known Allergies     Past Surgical History:     Past Surgical History:   Procedure Laterality Date   • TONSILLECTOMY AND ADENOIDECTOMY           Social History:     Social History     Socioeconomic History   • Marital status: Single     Spouse name: Not on file   • Number of children: Not on file   • Years of education: Not on file   • Highest education level: Not on file   Tobacco Use   • Smoking status: Never Smoker   • Smokeless tobacco: Current User     Types: Chew   Substance and Sexual Activity   • Alcohol use: No     Frequency: Never   • Drug use: No   • Sexual activity: Defer       Family History:     Family History   Problem Relation Age of Onset   • Hearing loss Father    • No Known Problems Mother        Review of Systems:     Review of Systems   Constitutional: Negative.    HENT: Negative.    Eyes: Negative.    Respiratory: Negative.    Cardiovascular: Negative.    Gastrointestinal: Negative.    Endocrine: Negative.    Genitourinary: Positive for scrotal swelling and testicular pain.   Musculoskeletal: Negative.    Allergic/Immunologic: Negative.    Neurological: Negative.    Hematological: Negative.    Psychiatric/Behavioral: Negative.        Physical Exam:     Physical Exam   Constitutional: He is oriented to person, place, and time. He appears well-developed and  well-nourished.   HENT:   Head: Normocephalic and atraumatic.   Eyes: Pupils are equal, round, and reactive to light. Conjunctivae and EOM are normal.   Neck: Normal range of motion.   Cardiovascular: Normal rate, regular rhythm, normal heart sounds and intact distal pulses.   Pulmonary/Chest: Effort normal and breath sounds normal.   Abdominal: Soft. Bowel sounds are normal.   Genitourinary:   Genitourinary Comments: Healing midline scrotal incision   Musculoskeletal: Normal range of motion.   Neurological: He is alert and oriented to person, place, and time. He has normal reflexes.   Skin: Skin is warm and dry.   Psychiatric: He has a normal mood and affect. His behavior is normal. Judgment and thought content normal.   Nursing note and vitals reviewed.      I have reviewed the following portions of the patient's history: allergies, current medications, past family history, past medical history, past social history, past surgical history, problem list and ROS and confirm it's accurate.      Procedure:       Assessment/Plan:   Vasectomy status-patient had a successful vasectomy and is doing well.  He voices that he had adequate part of ejaculations and here for semen check to be sure he is free of spermatozoa.  Initially, he said no complications from the surgery particularly pain, hemorrhage, bleeding, infection.  And is overall pleased with the experience  Scrotal incision infection resolving  Narcotic pain medication-patient has significant acute pain that I believe would be an indication for the use of narcotic pain medication.  I discussed the significant risks of pain medication and the fact that this will be a short only option and I will give her no more than a three-day supply of pain medication.  And I will not plan long-term medication and that this will be sent to a pain clinic if at all becomes necessary.  We discussed signing a pain medication agreement and the fact that we're going to run a state  [place] review to be sure the patient is not getting pain medication from elsewhere.  If this is the case we will not give pain medication.  As part of the patient's treatment plan of there being prescribed a controlled substance with potential for abuse.  This patient has been wait aware of the appropriate dose of such medications including, the risk for somnolence, limited ability to drive and/or safety and the significant potential for overdose.  It is been made clear that these medications are for the prescribed patient only without concomitant use of alcohol or other sepsis unless prescribed by the medical provider.  Has completed prescribing agreement detailing the terms of continue prescribing him a controlled substance.  Including monitoring [place] ports, the possibility of urine drug screens and pedal counts.  The patient is aware that we reviewed [employer] reports on a regular basis and scan them into the chart.  History and physical examination exhibited continued safe and appropriate use of controlled substances.  Also discussed the fact that the new [place] legislation allows only a three-day prescription for pain medication.  In this situation he will be referred to a chronic pain clinic.            Patient's Body mass index is 30.71 kg/m². BMI is above normal parameters. Recommendations include: educational material.              This document has been electronically signed by GEORGIA BECKHAM MD December 17, 2019 3:45 PM

## 2019-12-18 ENCOUNTER — TELEPHONE (OUTPATIENT)
Dept: GASTROENTEROLOGY | Facility: CLINIC | Age: 24
End: 2019-12-18

## 2019-12-18 DIAGNOSIS — B18.2 CHRONIC HEPATITIS C WITHOUT HEPATIC COMA (HCC): Primary | ICD-10-CM

## 2019-12-18 PROBLEM — N49.2 SCROTAL INFECTION: Status: ACTIVE | Noted: 2019-12-18

## 2019-12-18 LAB
HCV RNA SERPL NAA+PROBE-ACNC: NORMAL IU/ML
HCV RNA SERPL NAA+PROBE-LOG IU: 6.2 LOG10 IU/ML
TEST INFORMATION: NORMAL

## 2019-12-18 NOTE — TELEPHONE ENCOUNTER
Pt has Hep C, genotype 1a, treatment naive, without cirrhosis (F0 fibrosis score). I have prescribed Mavyret for tx.

## 2019-12-20 ENCOUNTER — SPECIALTY PHARMACY (OUTPATIENT)
Dept: PHARMACY | Facility: HOSPITAL | Age: 24
End: 2019-12-20

## 2019-12-20 DIAGNOSIS — B18.2 CHRONIC HEPATITIS C WITHOUT HEPATIC COMA (HCC): ICD-10-CM

## 2020-01-07 ENCOUNTER — SPECIALTY PHARMACY (OUTPATIENT)
Dept: PHARMACY | Facility: HOSPITAL | Age: 25
End: 2020-01-07

## 2020-01-07 RX ORDER — VELPATASVIR AND SOFOSBUVIR 100; 400 MG/1; MG/1
1 TABLET, FILM COATED ORAL DAILY
Qty: 28 TABLET | Refills: 2 | Status: SHIPPED | OUTPATIENT
Start: 2020-01-07 | End: 2021-11-03

## 2020-02-04 DIAGNOSIS — Z98.52 VASECTOMY STATUS: ICD-10-CM

## 2020-02-05 ENCOUNTER — SPECIALTY PHARMACY (OUTPATIENT)
Dept: PHARMACY | Facility: HOSPITAL | Age: 25
End: 2020-02-05

## 2020-02-05 ENCOUNTER — OFFICE VISIT (OUTPATIENT)
Dept: PHARMACY | Facility: HOSPITAL | Age: 25
End: 2020-02-05

## 2020-02-05 VITALS
HEIGHT: 68 IN | OXYGEN SATURATION: 97 % | WEIGHT: 202 LBS | DIASTOLIC BLOOD PRESSURE: 79 MMHG | HEART RATE: 62 BPM | BODY MASS INDEX: 30.62 KG/M2 | SYSTOLIC BLOOD PRESSURE: 127 MMHG

## 2020-02-05 DIAGNOSIS — B18.2 CHRONIC HEPATITIS C WITHOUT HEPATIC COMA (HCC): Primary | ICD-10-CM

## 2020-02-05 DIAGNOSIS — Z23 NEED FOR VACCINATION AGAINST HEPATITIS B VIRUS: ICD-10-CM

## 2020-02-05 PROCEDURE — 87522 HEPATITIS C REVRS TRNSCRPJ: CPT

## 2020-02-05 PROCEDURE — 99214 OFFICE O/P EST MOD 30 MIN: CPT | Performed by: PHYSICIAN ASSISTANT

## 2020-02-05 PROCEDURE — 80053 COMPREHEN METABOLIC PANEL: CPT

## 2020-02-05 PROCEDURE — 36415 COLL VENOUS BLD VENIPUNCTURE: CPT

## 2020-02-05 RX ORDER — HYDROCODONE BITARTRATE AND ACETAMINOPHEN 10; 325 MG/1; MG/1
1 TABLET ORAL EVERY 6 HOURS PRN
Qty: 10 TABLET | Refills: 0 | OUTPATIENT
Start: 2020-02-05

## 2020-02-05 NOTE — TELEPHONE ENCOUNTER
Pt needs an appointment to refill the pain medication. This is a controlled substance and needs to come in for it.

## 2020-02-05 NOTE — PROGRESS NOTES
: 1995    Chief Complaint   Patient presents with   • Hepatitis C       Thien Covarrubias is a 25 y.o. male who presents to the office today as a follow up appointment regarding Hepatitis C.    History of Present Illness:  Patient has been taking Epclusa 1 tab PO once daily as prescribed for treatment of Hep C. He has intermittent and mild nausea but denies any other noticeable side effects. He has not missed any doses of this medication. He denies any current alcohol or drug use. He has not had Hep B vaccination yet.    He found out about having Hep C approx 2019. He is unsure how long he has had it. He has not used IV drugs for 9 years. He had a needle stick from another drug user even after he stopped using drugs. No previous daily alcohol use. He does have a non-professional tattoo. His father had Hep C and was cured but no known family history of cirrhosis or liver disease.     Review of Systems   Constitutional: Negative for chills, fatigue and fever.   HENT: Negative for trouble swallowing.    Eyes: Negative.    Respiratory: Negative for cough, choking, chest tightness and shortness of breath.    Cardiovascular: Negative for chest pain.   Gastrointestinal: Negative for abdominal distention, abdominal pain, anal bleeding, blood in stool, constipation, diarrhea, nausea and vomiting.   Endocrine: Negative.    Genitourinary: Negative for difficulty urinating.   Musculoskeletal: Negative for back pain and neck pain.   Skin: Positive for rash. Negative for color change, pallor and wound.   Allergic/Immunologic: Negative for environmental allergies and food allergies.   Neurological: Negative for dizziness, light-headedness and headaches.   Hematological: Does not bruise/bleed easily.   Psychiatric/Behavioral: The patient is nervous/anxious.      I have reviewed and confirmed the accuracy of the ROS as documented by the MA/LPN/RN Namrata Webb PA-C    Past Medical History:   Diagnosis Date   • Infectious  "viral hepatitis        Past Surgical History:   Procedure Laterality Date   • TONSILLECTOMY AND ADENOIDECTOMY         Family History   Problem Relation Age of Onset   • Hearing loss Father    • No Known Problems Mother        Social History     Socioeconomic History   • Marital status: Single     Spouse name: Not on file   • Number of children: Not on file   • Years of education: Not on file   • Highest education level: Not on file   Tobacco Use   • Smoking status: Never Smoker   • Smokeless tobacco: Current User     Types: Chew   Substance and Sexual Activity   • Alcohol use: No     Frequency: Never   • Drug use: No   • Sexual activity: Defer       Current Outpatient Medications:   •  Sofosbuvir-Velpatasvir (Epclusa) 400-100 MG tablet, Take 1 tablet by mouth Daily., Disp: 28 tablet, Rfl: 2    Allergies:   Patient has no known allergies.    Vitals:  /79 (BP Location: Right arm, Patient Position: Sitting, Cuff Size: Adult)   Pulse 62   Ht 172.7 cm (68\")   Wt 91.6 kg (202 lb)   SpO2 97%   BMI 30.71 kg/m²     Physical Exam   Constitutional: He is oriented to person, place, and time. He appears well-developed and well-nourished. No distress.   HENT:   Head: Normocephalic and atraumatic.   Right Ear: External ear normal.   Left Ear: External ear normal.   Nose: Nose normal.   Eyes: Conjunctivae and EOM are normal. Right eye exhibits no discharge. Left eye exhibits no discharge. No scleral icterus.   Neck: Normal range of motion. No tracheal deviation present.   Pulmonary/Chest: Effort normal. No respiratory distress.   Musculoskeletal: Normal range of motion. He exhibits no edema.   Neurological: He is alert and oriented to person, place, and time. Coordination normal.   Skin: No rash noted. He is not diaphoretic. No erythema. No pallor.   Psychiatric: He has a normal mood and affect. His behavior is normal. Judgment and thought content normal.     Results Review:  Ultrasound liver 6/28/2019 normal     Labs " 6/21/2019: Hepatitis C genotype 1a, AFP normal, CBC normal, , AST 58, alk phos 62, total bilirubin 0.5, , creatinine 0.70, F0 fibrosis score, A2 inflammation, hep C viral load 790,000, hepatitis A total antibody positive, hepatitis B core total antibody negative, hepatitis B surface antibody negative, INR normal, drug screen negative.     Labs 5/2019:  Acute hepatitis panel pos Hep C only  AST 46  ALT 91  HIV negative   Cholesterol normal  Plt 250,000  H/H normal  GFR normal  TSH normal    Assessment:  1. Chronic hepatitis C without hepatic coma (CMS/HCC)    2. Need for vaccination against hepatitis B virus      Plan:  Orders Placed This Encounter   Procedures   • Hepatitis C RNA, Quantitative, PCR (graph)   • Comprehensive Metabolic Panel     He will continue Epclusa for treatement of hepatitis C, genotype 1a, treatment naïve without cirrhosis for total duration of treatment of 3 months as directed.  Take this medication daily and do not miss any doses.  He will call with any concerns.  He will have labs today which is 4 weeks after starting medication to monitor response to therapy.  He will also have labs at completion of therapy and 3-month status post therapy for confirmation of cure.  Continue abstinence from alcohol and illicit drugs.     Series of immunizations for Hepatitis B should be obtained and have been recommended today. Immunity is important to prevent further insult to his liver. Information regarding locations that these can be performed has been expressed.        Follow up after final labs have been completed.      Electronically signed 2/5/2020 12:03 PM  Namrata Webb PA-C, [facility]

## 2020-02-06 LAB
ALBUMIN SERPL-MCNC: 4.5 G/DL (ref 3.5–5.2)
ALBUMIN/GLOB SERPL: 2 G/DL
ALP SERPL-CCNC: 54 U/L (ref 39–117)
ALT SERPL W P-5'-P-CCNC: 39 U/L (ref 1–41)
ANION GAP SERPL CALCULATED.3IONS-SCNC: 11.9 MMOL/L (ref 5–15)
AST SERPL-CCNC: 25 U/L (ref 1–40)
BILIRUB SERPL-MCNC: 0.5 MG/DL (ref 0.2–1.2)
BUN BLD-MCNC: 9 MG/DL (ref 6–20)
BUN/CREAT SERPL: 13.6 (ref 7–25)
CALCIUM SPEC-SCNC: 9.2 MG/DL (ref 8.6–10.5)
CHLORIDE SERPL-SCNC: 99 MMOL/L (ref 98–107)
CO2 SERPL-SCNC: 27.1 MMOL/L (ref 22–29)
CREAT BLD-MCNC: 0.66 MG/DL (ref 0.76–1.27)
GFR SERPL CREATININE-BSD FRML MDRD: 145 ML/MIN/1.73
GFR SERPL CREATININE-BSD FRML MDRD: >150 ML/MIN/1.73
GLOBULIN UR ELPH-MCNC: 2.2 GM/DL
GLUCOSE BLD-MCNC: 93 MG/DL (ref 65–99)
POTASSIUM BLD-SCNC: 4.1 MMOL/L (ref 3.5–5.2)
PROT SERPL-MCNC: 6.7 G/DL (ref 6–8.5)
SODIUM BLD-SCNC: 138 MMOL/L (ref 136–145)

## 2020-02-08 LAB
HCV RNA SERPL NAA+PROBE-ACNC: NORMAL IU/ML
TEST INFORMATION: NORMAL

## 2020-02-21 NOTE — PROGRESS NOTES
Specialty Pharmacy Note      Name:  Thien Covarrubias  :  1995  Date:  2020         Past Medical History:   Diagnosis Date   • Infectious viral hepatitis        Past Surgical History:   Procedure Laterality Date   • TONSILLECTOMY AND ADENOIDECTOMY         Social History     Socioeconomic History   • Marital status: Single     Spouse name: Not on file   • Number of children: Not on file   • Years of education: Not on file   • Highest education level: Not on file   Tobacco Use   • Smoking status: Never Smoker   • Smokeless tobacco: Current User     Types: Chew   Substance and Sexual Activity   • Alcohol use: No     Frequency: Never   • Drug use: No   • Sexual activity: Defer       Family History   Problem Relation Age of Onset   • Hearing loss Father    • No Known Problems Mother        No Known Allergies    Current Outpatient Medications   Medication Sig Dispense Refill   • Sofosbuvir-Velpatasvir 400-100 MG tablet Take 1 tablet by mouth Daily. 28 tablet 2     No current facility-administered medications for this visit.          LABORATORY:    Lab Results   Component Value Date    PROTIME 12.8 2019    INR 0.91 2019     Lab Results   Component Value Date    PROTIME 12.8 2019    INR 0.91 2019     Lab Results   Component Value Date    HAV Positive (A) 2019    HCVQUANT 385873 2019    KMBVOS16 6.204 2019    HCVINFO Comment 2019    HCVGENOTYPE Comment 2019    HCVGENOTYPE 1a 2019     Lab Results   Component Value Date    AMPHETSCREEN Negative 2019    BARBITSCNUR Negative 2019    LABBENZSCN Negative 2019    COCAINEUR Negative 2019    LABMETHSCN Negative 2019    HEPBSAB Non-Reactive 2019    OXYCODONESCN Negative 2019    6ACETYLMORP Negative 2019    BUPRENORSCNU Negative 2019    LABOPIASCN Negative 2019    PCPUR Negative 2019    THCURSCR Negative 2019     Last Urine Toxicity      LAST URINE TOXICITY RESULTS Latest Ref Rng & Units 12/16/2019    AMPHETAMINES SCREEN, URINE Negative Negative    BARBITURATES SCREEN Negative Negative    BENZODIAZEPINE SCREEN, URINE Negative Negative    BUPRENORPHINEUR Negative Negative    COCAINE SCREEN, URINE Negative Negative    METHADONE SCREEN, URINE Negative Negative          ASSESSMENT/PLAN:    Patient Update Assessment (new medications, allergies, medical history): No changes.     Medication(s): Sofosbuvir-Velpatasvir 400-100 MG tablet for the treatment of Hepatitis C.     Currently Taking Medication(s): Patient taking medication as directed.     Effectiveness of Medication: Effectiveness will be determined after patient has taken four weeks of medication and lab work has been obtained as ordered by provider.  (See under tab Labs)    Experiencing Side Effects: Patient expresses intermittent and mild nausea but denies any other noticeable side effects.    Prior Authorization Status: Approved.     Financial Assistance Status: Assistance is not needed at this time, insurance covers entire cost of medication.     Any Issues Identified: No issues expressed from patient, no issues processing refill.      Appropriate to Process Prescription(s): Yes.  Medication will be dispensed from [facility] and delivered to patient via [employer] overnight delivery services.     Counseling Offered: Patient was counseled with initial fill.  He is aware to contact pharmacy with any new questions or concerns.    Next Specialty Pharmacy Visit: Scheduled for 03/02/2020.

## 2020-03-02 ENCOUNTER — SPECIALTY PHARMACY (OUTPATIENT)
Dept: PHARMACY | Facility: HOSPITAL | Age: 25
End: 2020-03-02

## 2020-03-06 NOTE — PROGRESS NOTES
Specialty Pharmacy Note      Name:  Thien Covarrubias  :  1995  Date:  2020         Past Medical History:   Diagnosis Date   • Infectious viral hepatitis        Past Surgical History:   Procedure Laterality Date   • TONSILLECTOMY AND ADENOIDECTOMY         Social History     Socioeconomic History   • Marital status: Single     Spouse name: Not on file   • Number of children: Not on file   • Years of education: Not on file   • Highest education level: Not on file   Tobacco Use   • Smoking status: Never Smoker   • Smokeless tobacco: Current User     Types: Chew   Substance and Sexual Activity   • Alcohol use: No     Frequency: Never   • Drug use: No   • Sexual activity: Defer       Family History   Problem Relation Age of Onset   • Hearing loss Father    • No Known Problems Mother        No Known Allergies    Current Outpatient Medications   Medication Sig Dispense Refill   • Sofosbuvir-Velpatasvir 400-100 MG tablet Take 1 tablet by mouth Daily. 28 tablet 2     No current facility-administered medications for this visit.          LABORATORY:    Lab Results   Component Value Date    PROTIME 12.8 2019    INR 0.91 2019     Lab Results   Component Value Date    PROTIME 12.8 2019    INR 0.91 2019     Lab Results   Component Value Date    HAV Positive (A) 2019    HCVQUANT 696571 2019    SMBPWN86 6.204 2019    HCVINFO Comment 2019    HCVGENOTYPE Comment 2019    HCVGENOTYPE 1a 2019     Lab Results   Component Value Date    AMPHETSCREEN Negative 2019    BARBITSCNUR Negative 2019    LABBENZSCN Negative 2019    COCAINEUR Negative 2019    LABMETHSCN Negative 2019    HEPBSAB Non-Reactive 2019    OXYCODONESCN Negative 2019    6ACETYLMORP Negative 2019    BUPRENORSCNU Negative 2019    LABOPIASCN Negative 2019    PCPUR Negative 2019    THCURSCR Negative 2019     Last Urine Toxicity      LAST URINE TOXICITY RESULTS Latest Ref Rng & Units 12/16/2019    AMPHETAMINES SCREEN, URINE Negative Negative    BARBITURATES SCREEN Negative Negative    BENZODIAZEPINE SCREEN, URINE Negative Negative    BUPRENORPHINEUR Negative Negative    COCAINE SCREEN, URINE Negative Negative    METHADONE SCREEN, URINE Negative Negative          ASSESSMENT/PLAN:    Patient Update Assessment (new medications, allergies, medical history): No changes.      Medication(s): Sofosbuvir-Velpatasvir 400-100 MG tablet for the treatment of Hepatitis C.      Currently Taking Medication(s): Patient taking medication as directed.      Effectiveness of Medication: Effectiveness will be determined after patient has taken four weeks of medication and lab work has been obtained as ordered by provider.  (See under tab Labs)     Experiencing Side Effects: Patient expresses intermittent and mild nausea but denies any other noticeable side effects.     Prior Authorization Status: Approved.      Financial Assistance Status: Assistance is not needed at this time, insurance covers entire cost of medication.      Any Issues Identified: No issues expressed from patient, no issues processing refill.              Appropriate to Process Prescription(s): Yes.  Medication will be dispensed from [facility] and delivered to patient via [employer] [facility] overnight delivery services.      Counseling Offered: Patient previously counseled upon initiation of therapy, aware to contact pharmacy with any new questions or concerns.     Next Specialty Pharmacy Visit:  Not needed.  This refill is for the last four weeks of a twelve week medication regimen.  Specialty Pharmacy will follow as needed.

## 2020-04-16 ENCOUNTER — LAB (OUTPATIENT)
Dept: PHARMACY | Facility: HOSPITAL | Age: 25
End: 2020-04-16

## 2020-04-16 DIAGNOSIS — B18.2 HEP C W/O COMA, CHRONIC (HCC): Primary | ICD-10-CM

## 2020-04-16 PROCEDURE — 80053 COMPREHEN METABOLIC PANEL: CPT

## 2020-04-16 PROCEDURE — 36415 COLL VENOUS BLD VENIPUNCTURE: CPT

## 2020-04-16 PROCEDURE — 87522 HEPATITIS C REVRS TRNSCRPJ: CPT

## 2020-04-17 LAB
ALBUMIN SERPL-MCNC: 4.7 G/DL (ref 3.5–5.2)
ALBUMIN/GLOB SERPL: 1.7 G/DL
ALP SERPL-CCNC: 53 U/L (ref 39–117)
ALT SERPL W P-5'-P-CCNC: 34 U/L (ref 1–41)
ANION GAP SERPL CALCULATED.3IONS-SCNC: 10.3 MMOL/L (ref 5–15)
AST SERPL-CCNC: 20 U/L (ref 1–40)
BILIRUB SERPL-MCNC: 0.3 MG/DL (ref 0.2–1.2)
BUN BLD-MCNC: 8 MG/DL (ref 6–20)
BUN/CREAT SERPL: 10.7 (ref 7–25)
CALCIUM SPEC-SCNC: 9.7 MG/DL (ref 8.6–10.5)
CHLORIDE SERPL-SCNC: 98 MMOL/L (ref 98–107)
CO2 SERPL-SCNC: 29.7 MMOL/L (ref 22–29)
CREAT BLD-MCNC: 0.75 MG/DL (ref 0.76–1.27)
GFR SERPL CREATININE-BSD FRML MDRD: 125 ML/MIN/1.73
GFR SERPL CREATININE-BSD FRML MDRD: >150 ML/MIN/1.73
GLOBULIN UR ELPH-MCNC: 2.8 GM/DL
GLUCOSE BLD-MCNC: 87 MG/DL (ref 65–99)
POTASSIUM BLD-SCNC: 4.3 MMOL/L (ref 3.5–5.2)
PROT SERPL-MCNC: 7.5 G/DL (ref 6–8.5)
SODIUM BLD-SCNC: 138 MMOL/L (ref 136–145)

## 2020-04-18 LAB
HCV RNA SERPL NAA+PROBE-ACNC: NORMAL IU/ML
TEST INFORMATION: NORMAL

## 2020-04-20 ENCOUNTER — TELEPHONE (OUTPATIENT)
Dept: GASTROENTEROLOGY | Facility: CLINIC | Age: 25
End: 2020-04-20

## 2020-04-20 NOTE — TELEPHONE ENCOUNTER
Please let pt know that labs at completion of Hep C therapy with Epclusa show HCV not detected and liver enzymes normal. He needs labs again 3 mo s/p completion of therapy then f/u 1 week after for results. Please arrange.

## 2020-10-17 ENCOUNTER — HOSPITAL ENCOUNTER (EMERGENCY)
Facility: HOSPITAL | Age: 25
Discharge: HOME | End: 2020-10-17
Attending: EMERGENCY MEDICINE | Admitting: EMERGENCY MEDICINE

## 2020-10-17 VITALS
RESPIRATION RATE: 16 BRPM | TEMPERATURE: 98.6 F | WEIGHT: 212 LBS | SYSTOLIC BLOOD PRESSURE: 141 MMHG | HEIGHT: 66 IN | DIASTOLIC BLOOD PRESSURE: 83 MMHG | OXYGEN SATURATION: 100 % | BODY MASS INDEX: 34.07 KG/M2 | HEART RATE: 73 BPM

## 2020-10-17 DIAGNOSIS — F41.9 ANXIETY: ICD-10-CM

## 2020-10-17 DIAGNOSIS — R20.2 TINGLING: Primary | ICD-10-CM

## 2020-10-17 LAB
ALBUMIN SERPL-MCNC: 5.15 G/DL (ref 3.5–5.2)
ALBUMIN/GLOB SERPL: 2 G/DL
ALP SERPL-CCNC: 73 U/L (ref 39–117)
ALT SERPL W P-5'-P-CCNC: 34 U/L (ref 1–41)
ANION GAP SERPL CALCULATED.3IONS-SCNC: 9.9 MMOL/L (ref 5–15)
AST SERPL-CCNC: 26 U/L (ref 1–40)
BASOPHILS # BLD AUTO: 0.03 10*3/MM3 (ref 0–0.2)
BASOPHILS NFR BLD AUTO: 0.3 % (ref 0–1.5)
BILIRUB SERPL-MCNC: 0.8 MG/DL (ref 0–1.2)
BUN SERPL-MCNC: 12 MG/DL (ref 6–20)
BUN/CREAT SERPL: 13.5 (ref 7–25)
CALCIUM SPEC-SCNC: 10.1 MG/DL (ref 8.6–10.5)
CHLORIDE SERPL-SCNC: 99 MMOL/L (ref 98–107)
CO2 SERPL-SCNC: 29.1 MMOL/L (ref 22–29)
CREAT SERPL-MCNC: 0.89 MG/DL (ref 0.76–1.27)
DEPRECATED RDW RBC AUTO: 37.2 FL (ref 37–54)
EOSINOPHIL # BLD AUTO: 0.15 10*3/MM3 (ref 0–0.4)
EOSINOPHIL NFR BLD AUTO: 1.6 % (ref 0.3–6.2)
ERYTHROCYTE [DISTWIDTH] IN BLOOD BY AUTOMATED COUNT: 11.6 % (ref 12.3–15.4)
GFR SERPL CREATININE-BSD FRML MDRD: 103 ML/MIN/1.73
GFR SERPL CREATININE-BSD FRML MDRD: 124 ML/MIN/1.73
GLOBULIN UR ELPH-MCNC: 2.6 GM/DL
GLUCOSE SERPL-MCNC: 119 MG/DL (ref 65–99)
HCT VFR BLD AUTO: 47.3 % (ref 37.5–51)
HGB BLD-MCNC: 15.7 G/DL (ref 13–17.7)
IMM GRANULOCYTES # BLD AUTO: 0.06 10*3/MM3 (ref 0–0.05)
IMM GRANULOCYTES NFR BLD AUTO: 0.6 % (ref 0–0.5)
LYMPHOCYTES # BLD AUTO: 1.71 10*3/MM3 (ref 0.7–3.1)
LYMPHOCYTES NFR BLD AUTO: 18.2 % (ref 19.6–45.3)
MCH RBC QN AUTO: 29.2 PG (ref 26.6–33)
MCHC RBC AUTO-ENTMCNC: 33.2 G/DL (ref 31.5–35.7)
MCV RBC AUTO: 88.1 FL (ref 79–97)
MONOCYTES # BLD AUTO: 0.83 10*3/MM3 (ref 0.1–0.9)
MONOCYTES NFR BLD AUTO: 8.8 % (ref 5–12)
NEUTROPHILS NFR BLD AUTO: 6.62 10*3/MM3 (ref 1.7–7)
NEUTROPHILS NFR BLD AUTO: 70.5 % (ref 42.7–76)
NRBC BLD AUTO-RTO: 0 /100 WBC (ref 0–0.2)
PLATELET # BLD AUTO: 237 10*3/MM3 (ref 140–450)
PMV BLD AUTO: 10 FL (ref 6–12)
POTASSIUM SERPL-SCNC: 4.6 MMOL/L (ref 3.5–5.2)
PROT SERPL-MCNC: 7.7 G/DL (ref 6–8.5)
RBC # BLD AUTO: 5.37 10*6/MM3 (ref 4.14–5.8)
SODIUM SERPL-SCNC: 138 MMOL/L (ref 136–145)
TSH SERPL DL<=0.05 MIU/L-ACNC: 1.59 UIU/ML (ref 0.27–4.2)
WBC # BLD AUTO: 9.4 10*3/MM3 (ref 3.4–10.8)

## 2020-10-17 PROCEDURE — 99283 EMERGENCY DEPT VISIT LOW MDM: CPT

## 2020-10-17 PROCEDURE — 84443 ASSAY THYROID STIM HORMONE: CPT | Performed by: PHYSICIAN ASSISTANT

## 2020-10-17 PROCEDURE — 80053 COMPREHEN METABOLIC PANEL: CPT | Performed by: PHYSICIAN ASSISTANT

## 2020-10-17 PROCEDURE — 85025 COMPLETE CBC W/AUTO DIFF WBC: CPT | Performed by: PHYSICIAN ASSISTANT

## 2020-10-17 RX ORDER — HYDROXYZINE PAMOATE 25 MG/1
25 CAPSULE ORAL 3 TIMES DAILY PRN
Qty: 15 CAPSULE | Refills: 0 | Status: SHIPPED | OUTPATIENT
Start: 2020-10-17 | End: 2021-11-03

## 2020-10-17 NOTE — ED NOTES
Pt reports he started develpoing sores in his mouth that started about 3 days ago when he stopped dipping tobacco and diarrhea.             Arpita Lindquist, RN  10/17/20 1959

## 2020-10-17 NOTE — ED PROVIDER NOTES
Subjective   25-year-old male with past medical history of anxiety presents to the emergency room with right-sided mouth numbness on the outside of his lips.  Patient states he quit dipping 4 days ago and since that time started with a tingling.  He does state that he has anxiety and thinks this may be contributing factor.  He denies headaches, visual changes, nausea, vomiting, chest pain.  Denies any aggravating or alleviating factors.  He also states that he thinks he has a rash on the inside of his lip.      History provided by:  Patient   used: No        Review of Systems   Constitutional: Negative.  Negative for fever.   HENT: Negative.    Respiratory: Negative.    Cardiovascular: Negative.  Negative for chest pain.   Gastrointestinal: Negative.  Negative for abdominal pain.   Endocrine: Negative.    Genitourinary: Negative.  Negative for dysuria.   Skin: Negative.    Neurological: Negative.         (+) Right sided mouth tingling   Psychiatric/Behavioral: The patient is nervous/anxious.    All other systems reviewed and are negative.      Past Medical History:   Diagnosis Date   • Infectious viral hepatitis        No Known Allergies    Past Surgical History:   Procedure Laterality Date   • TONSILLECTOMY AND ADENOIDECTOMY         Family History   Problem Relation Age of Onset   • Hearing loss Father    • No Known Problems Mother        Social History     Socioeconomic History   • Marital status: Single     Spouse name: Not on file   • Number of children: Not on file   • Years of education: Not on file   • Highest education level: Not on file   Tobacco Use   • Smoking status: Never Smoker   • Smokeless tobacco: Current User     Types: Chew   Substance and Sexual Activity   • Alcohol use: No     Frequency: Never   • Drug use: No   • Sexual activity: Defer           Objective   Physical Exam  Vitals signs and nursing note reviewed.   Constitutional:       General: He is not in acute distress.      Appearance: He is well-developed. He is not diaphoretic.   HENT:      Head: Normocephalic and atraumatic.      Right Ear: External ear normal.      Left Ear: External ear normal.      Nose: Nose normal.      Mouth/Throat:      Lips: Pink.      Mouth: Mucous membranes are moist.      Dentition: Normal dentition. No gingival swelling.      Tongue: No lesions.      Palate: No mass.      Pharynx: Oropharynx is clear.      Tonsils: No tonsillar exudate.   Eyes:      Conjunctiva/sclera: Conjunctivae normal.      Pupils: Pupils are equal, round, and reactive to light.   Neck:      Musculoskeletal: Normal range of motion and neck supple.      Vascular: No JVD.      Trachea: No tracheal deviation.   Cardiovascular:      Rate and Rhythm: Normal rate and regular rhythm.      Heart sounds: Normal heart sounds. No murmur.   Pulmonary:      Effort: Pulmonary effort is normal. No respiratory distress.      Breath sounds: Normal breath sounds. No wheezing.   Abdominal:      General: Bowel sounds are normal.      Palpations: Abdomen is soft.      Tenderness: There is no abdominal tenderness.   Musculoskeletal: Normal range of motion.         General: No deformity.   Skin:     General: Skin is warm and dry.      Coloration: Skin is not pale.      Findings: No erythema or rash.   Neurological:      General: No focal deficit present.      Mental Status: He is alert and oriented to person, place, and time.      Cranial Nerves: Cranial nerves are intact. No cranial nerve deficit.      Sensory: Sensation is intact.      Motor: Motor function is intact.      Coordination: Coordination is intact.      Gait: Gait is intact.      Comments: Pt voices mild tingling to the right corner of mouth on the right side; states it's only on the outer lining of his lips    Psychiatric:         Behavior: Behavior normal.         Thought Content: Thought content normal.         Procedures           ED Course                                            MDM  Number of Diagnoses or Management Options  Anxiety: new and does not require workup  Tingling: new and requires workup     Amount and/or Complexity of Data Reviewed  Clinical lab tests: reviewed and ordered    Risk of Complications, Morbidity, and/or Mortality  Presenting problems: moderate  Diagnostic procedures: low  Management options: moderate    Patient Progress  Patient progress: stable      Final diagnoses:   Tingling   Anxiety            George Robledo PA-C  10/17/20 1956

## 2021-03-15 ENCOUNTER — BULK ORDERING (OUTPATIENT)
Dept: CASE MANAGEMENT | Facility: OTHER | Age: 26
End: 2021-03-15

## 2021-03-15 DIAGNOSIS — Z23 IMMUNIZATION DUE: ICD-10-CM

## 2021-03-30 ENCOUNTER — TRANSCRIBE ORDERS (OUTPATIENT)
Dept: ADMINISTRATIVE | Facility: HOSPITAL | Age: 26
End: 2021-03-30

## 2021-03-30 ENCOUNTER — HOSPITAL ENCOUNTER (OUTPATIENT)
Dept: HOSPITAL 79 - SLEEP | Age: 26
End: 2021-03-30
Payer: COMMERCIAL

## 2021-03-30 DIAGNOSIS — R10.84 ABDOMINAL PAIN, GENERALIZED: Primary | ICD-10-CM

## 2021-03-30 DIAGNOSIS — Z53.9: Primary | ICD-10-CM

## 2021-04-13 ENCOUNTER — HOSPITAL ENCOUNTER (OUTPATIENT)
Dept: ULTRASOUND IMAGING | Facility: HOSPITAL | Age: 26
Discharge: HOME | End: 2021-04-13

## 2021-04-13 DIAGNOSIS — R10.84 ABDOMINAL PAIN, GENERALIZED: ICD-10-CM

## 2021-04-14 ENCOUNTER — HOSPITAL ENCOUNTER (OUTPATIENT)
Dept: ULTRASOUND IMAGING | Facility: HOSPITAL | Age: 26
Discharge: HOME | End: 2021-04-14
Admitting: PHYSICIAN ASSISTANT

## 2021-04-14 ENCOUNTER — APPOINTMENT (OUTPATIENT)
Dept: ULTRASOUND IMAGING | Facility: HOSPITAL | Age: 26
End: 2021-04-14

## 2021-04-14 PROCEDURE — 76700 US EXAM ABDOM COMPLETE: CPT

## 2021-04-14 PROCEDURE — 76700 US EXAM ABDOM COMPLETE: CPT | Performed by: RADIOLOGY

## 2021-04-22 ENCOUNTER — HOSPITAL ENCOUNTER (OUTPATIENT)
Dept: HOSPITAL 79 - SLEEP | Age: 26
End: 2021-04-22
Payer: COMMERCIAL

## 2021-04-22 DIAGNOSIS — K58.0: ICD-10-CM

## 2021-04-22 DIAGNOSIS — K21.9: ICD-10-CM

## 2021-04-22 DIAGNOSIS — R10.84: ICD-10-CM

## 2021-04-22 DIAGNOSIS — M54.2: ICD-10-CM

## 2021-04-22 DIAGNOSIS — R06.83: Primary | ICD-10-CM

## 2021-04-22 DIAGNOSIS — R53.83: ICD-10-CM

## 2021-10-05 PROBLEM — J34.3 HYPERTROPHY OF NASAL TURBINATES: Status: ACTIVE | Noted: 2021-10-05

## 2021-10-05 PROBLEM — G47.33 OBSTRUCTIVE SLEEP APNEA (ADULT) (PEDIATRIC): Status: ACTIVE | Noted: 2021-10-05

## 2021-10-05 PROBLEM — J30.1 ALLERGIC RHINITIS DUE TO POLLEN: Status: ACTIVE | Noted: 2021-10-05

## 2021-10-05 PROBLEM — J34.2 DEVIATED NASAL SEPTUM: Status: ACTIVE | Noted: 2021-10-05

## 2021-10-27 ENCOUNTER — TRANSCRIBE ORDERS (OUTPATIENT)
Dept: ADMINISTRATIVE | Facility: HOSPITAL | Age: 26
End: 2021-10-27

## 2021-10-27 DIAGNOSIS — Z01.818 PRE-OPERATIVE CLEARANCE: Primary | ICD-10-CM

## 2021-11-03 ENCOUNTER — LAB (OUTPATIENT)
Dept: LAB | Facility: HOSPITAL | Age: 26
End: 2021-11-03

## 2021-11-03 ENCOUNTER — PRE-ADMISSION TESTING (OUTPATIENT)
Dept: PREADMISSION TESTING | Facility: HOSPITAL | Age: 26
End: 2021-11-03

## 2021-11-03 DIAGNOSIS — Z01.818 PRE-OPERATIVE CLEARANCE: ICD-10-CM

## 2021-11-03 LAB
ANION GAP SERPL CALCULATED.3IONS-SCNC: 11.3 MMOL/L (ref 5–15)
BUN SERPL-MCNC: 11 MG/DL (ref 6–20)
BUN/CREAT SERPL: 12.5 (ref 7–25)
CALCIUM SPEC-SCNC: 9.5 MG/DL (ref 8.6–10.5)
CHLORIDE SERPL-SCNC: 100 MMOL/L (ref 98–107)
CO2 SERPL-SCNC: 29.7 MMOL/L (ref 22–29)
CREAT SERPL-MCNC: 0.88 MG/DL (ref 0.76–1.27)
DEPRECATED RDW RBC AUTO: 37.5 FL (ref 37–54)
ERYTHROCYTE [DISTWIDTH] IN BLOOD BY AUTOMATED COUNT: 11.8 % (ref 12.3–15.4)
GFR SERPL CREATININE-BSD FRML MDRD: 103 ML/MIN/1.73
GFR SERPL CREATININE-BSD FRML MDRD: 125 ML/MIN/1.73
GLUCOSE SERPL-MCNC: 112 MG/DL (ref 65–99)
HCT VFR BLD AUTO: 46.7 % (ref 37.5–51)
HGB BLD-MCNC: 15.6 G/DL (ref 13–17.7)
MCH RBC QN AUTO: 29.3 PG (ref 26.6–33)
MCHC RBC AUTO-ENTMCNC: 33.4 G/DL (ref 31.5–35.7)
MCV RBC AUTO: 87.6 FL (ref 79–97)
PLATELET # BLD AUTO: 229 10*3/MM3 (ref 140–450)
PMV BLD AUTO: 9.9 FL (ref 6–12)
POTASSIUM SERPL-SCNC: 4.2 MMOL/L (ref 3.5–5.2)
RBC # BLD AUTO: 5.33 10*6/MM3 (ref 4.14–5.8)
SARS-COV-2 RNA PNL SPEC NAA+PROBE: NOT DETECTED
SODIUM SERPL-SCNC: 141 MMOL/L (ref 136–145)
WBC # BLD AUTO: 8.18 10*3/MM3 (ref 3.4–10.8)

## 2021-11-03 PROCEDURE — 85027 COMPLETE CBC AUTOMATED: CPT

## 2021-11-03 PROCEDURE — 80048 BASIC METABOLIC PNL TOTAL CA: CPT

## 2021-11-03 PROCEDURE — C9803 HOPD COVID-19 SPEC COLLECT: HCPCS

## 2021-11-03 PROCEDURE — U0004 COV-19 TEST NON-CDC HGH THRU: HCPCS | Performed by: OTOLARYNGOLOGY

## 2021-11-03 PROCEDURE — 36415 COLL VENOUS BLD VENIPUNCTURE: CPT

## 2021-11-03 RX ORDER — FLUTICASONE PROPIONATE 50 MCG
2 SPRAY, SUSPENSION (ML) NASAL DAILY
COMMUNITY
End: 2022-08-17

## 2021-11-03 NOTE — DISCHARGE INSTRUCTIONS
11/05/21   0700    TAKE the following medications the morning of surgery:  All heart or blood pressure medications    HOLD all diabetic medications the morning of surgery as ordered by physician.    Please discontinue all blood thinners and anticoagulants (except aspirin) prior to surgery as per your surgeon and cardiologist instructions.  Aspirin may be continued up to the day prior to surgery.     CHLORHEXIDINE CLOTHS GIVEN WITH INSTRUCTIONS AND FORM TO RETURN TO HOSPITAL, IF APPLICABLE.    General Instructions:  · Do not eat or drink after midnight: includes water, mints, or gum. You may brush your teeth.  Dental appliances that are removable must be taken out day of surgery.  · Do not smoke, chew tobacco, or drink alcohol.  · Bring medications in original bottles, any inhalers and if applicable your C-PAP/BI-PAP machine.  · Bring any papers given to you in the doctor's office.  · Wear clean comfortable clothes and socks.  · Do not wear contact lenses or make-up. Bring a case for your glasses if applicable.  · Bring crutches or walker if applicable.  · Leave all other valuables and jewelry at home.    If you were given a blood bank ID arm band remember to bring it with you the day of surgery.    Preventing a Surgical Site Infection:  Shower the night before surgery (unless instructed other wise) using a fresh bar of anti-bacterial soap (such as Dial) and clean washcloth. Dry with a clean towel and dress in clean clothing.  For 2 to 3 days before surgery, avoid shaving with a razor near where you will have surgery because the razor can irritate skin and make it easier to develop an infection. Ask your surgeon if you will be receiving antibiotics prior to surgery.  Make sure you, your family, and all healthcare providers clear their hands with soap and water or an alcohol-based hand  before caring for you or your wound.  If at all possible, quit smoking as many days before surgery as you can.    Day of  surgery:  Upon arrival, a Pre-op nurse and Anesthesiologist will review your health history, obtain vital signs, and answer questions you may have. The only belongings needed at this time will be your home medications and if applicable your C-PAP/BI-PAP machine. If you are staying overnight your family can leave the rest of your belongings in the car and bring them to your room later. A Pre-op nurse will start an IV and you may receive medication in preparation for surgery, including something to help you relax. Your family will be able to see you in the Pre-op area. While you are in surgery your family should notify the waiting room  if they leave the waiting room area and provide a contact phone number.    Please be aware that surgery does come with discomfort. We want to make every effort to control your discomfort so please discuss any uncontrolled symptoms with your nurse. Your doctor will most likely have prescribed pain medications.    If you are going home after surgery you will receive individualized written care instructions before being discharged. A responsible adult must drive you to and from the hospital on the day of surgery and stay with you for 24 hours.    If you are staying overnight following surgery, you will be transported to your hospital room following the recovery period.  [facility] has all private rooms.    If you have any questions please call Pre-Admission Testing at [phone].  Deductibles and co-payments are collected on the day of service. Please be prepared to pay the required co-pay, deductible or deposit on the day of service as defined by your plan.    A RESPONSIBLE PERSON MUST REMAIN IN THE WAITING ROOM DURING YOUR PROCEDURE AND A RESPONSIBLE  MUST BE AVAILABLE UPON YOUR DISCHARGE.

## 2021-11-05 ENCOUNTER — HOSPITAL ENCOUNTER (OUTPATIENT)
Facility: HOSPITAL | Age: 26
Setting detail: HOSPITAL OUTPATIENT SURGERY
Discharge: HOME | End: 2021-11-05
Attending: OTOLARYNGOLOGY | Admitting: OTOLARYNGOLOGY

## 2021-11-05 ENCOUNTER — ANESTHESIA (OUTPATIENT)
Dept: PERIOP | Facility: HOSPITAL | Age: 26
End: 2021-11-05

## 2021-11-05 ENCOUNTER — ANESTHESIA EVENT (OUTPATIENT)
Dept: PERIOP | Facility: HOSPITAL | Age: 26
End: 2021-11-05

## 2021-11-05 VITALS
BODY MASS INDEX: 37.2 KG/M2 | WEIGHT: 237 LBS | TEMPERATURE: 97.8 F | DIASTOLIC BLOOD PRESSURE: 71 MMHG | RESPIRATION RATE: 16 BRPM | HEIGHT: 67 IN | HEART RATE: 90 BPM | OXYGEN SATURATION: 93 % | SYSTOLIC BLOOD PRESSURE: 115 MMHG

## 2021-11-05 PROCEDURE — 25010000002 PROPOFOL 10 MG/ML EMULSION: Performed by: NURSE ANESTHETIST, CERTIFIED REGISTERED

## 2021-11-05 PROCEDURE — 25010000002 NEOSTIGMINE 10 MG/10ML SOLUTION: Performed by: NURSE ANESTHETIST, CERTIFIED REGISTERED

## 2021-11-05 PROCEDURE — 25010000002 FENTANYL CITRATE (PF) 50 MCG/ML SOLUTION: Performed by: NURSE ANESTHETIST, CERTIFIED REGISTERED

## 2021-11-05 PROCEDURE — 25010000002 ONDANSETRON PER 1 MG: Performed by: NURSE ANESTHETIST, CERTIFIED REGISTERED

## 2021-11-05 PROCEDURE — 25010000002 MIDAZOLAM PER 1 MG: Performed by: NURSE ANESTHETIST, CERTIFIED REGISTERED

## 2021-11-05 RX ORDER — IPRATROPIUM BROMIDE AND ALBUTEROL SULFATE 2.5; .5 MG/3ML; MG/3ML
3 SOLUTION RESPIRATORY (INHALATION) ONCE AS NEEDED
Status: DISCONTINUED | OUTPATIENT
Start: 2021-11-05 | End: 2021-11-05 | Stop reason: HOSPADM

## 2021-11-05 RX ORDER — LIDOCAINE HYDROCHLORIDE AND EPINEPHRINE 10; 10 MG/ML; UG/ML
INJECTION, SOLUTION INFILTRATION; PERINEURAL AS NEEDED
Status: DISCONTINUED | OUTPATIENT
Start: 2021-11-05 | End: 2021-11-05 | Stop reason: HOSPADM

## 2021-11-05 RX ORDER — PROPOFOL 10 MG/ML
VIAL (ML) INTRAVENOUS AS NEEDED
Status: DISCONTINUED | OUTPATIENT
Start: 2021-11-05 | End: 2021-11-05 | Stop reason: SURG

## 2021-11-05 RX ORDER — ONDANSETRON 2 MG/ML
INJECTION INTRAMUSCULAR; INTRAVENOUS AS NEEDED
Status: DISCONTINUED | OUTPATIENT
Start: 2021-11-05 | End: 2021-11-05 | Stop reason: SURG

## 2021-11-05 RX ORDER — GLYCOPYRROLATE 0.2 MG/ML
INJECTION INTRAMUSCULAR; INTRAVENOUS AS NEEDED
Status: DISCONTINUED | OUTPATIENT
Start: 2021-11-05 | End: 2021-11-05 | Stop reason: SURG

## 2021-11-05 RX ORDER — ROCURONIUM BROMIDE 10 MG/ML
INJECTION, SOLUTION INTRAVENOUS AS NEEDED
Status: DISCONTINUED | OUTPATIENT
Start: 2021-11-05 | End: 2021-11-05 | Stop reason: SURG

## 2021-11-05 RX ORDER — ONDANSETRON 2 MG/ML
4 INJECTION INTRAMUSCULAR; INTRAVENOUS AS NEEDED
Status: DISCONTINUED | OUTPATIENT
Start: 2021-11-05 | End: 2021-11-05 | Stop reason: HOSPADM

## 2021-11-05 RX ORDER — MIDAZOLAM HYDROCHLORIDE 1 MG/ML
INJECTION INTRAMUSCULAR; INTRAVENOUS AS NEEDED
Status: DISCONTINUED | OUTPATIENT
Start: 2021-11-05 | End: 2021-11-05 | Stop reason: SURG

## 2021-11-05 RX ORDER — NEOSTIGMINE METHYLSULFATE 1 MG/ML
INJECTION, SOLUTION INTRAVENOUS AS NEEDED
Status: DISCONTINUED | OUTPATIENT
Start: 2021-11-05 | End: 2021-11-05 | Stop reason: SURG

## 2021-11-05 RX ORDER — SODIUM CHLORIDE, SODIUM LACTATE, POTASSIUM CHLORIDE, CALCIUM CHLORIDE 600; 310; 30; 20 MG/100ML; MG/100ML; MG/100ML; MG/100ML
125 INJECTION, SOLUTION INTRAVENOUS ONCE
Status: COMPLETED | OUTPATIENT
Start: 2021-11-05 | End: 2021-11-05

## 2021-11-05 RX ORDER — SODIUM CHLORIDE 0.9 % (FLUSH) 0.9 %
10 SYRINGE (ML) INJECTION EVERY 12 HOURS SCHEDULED
Status: DISCONTINUED | OUTPATIENT
Start: 2021-11-05 | End: 2021-11-05 | Stop reason: HOSPADM

## 2021-11-05 RX ORDER — DROPERIDOL 2.5 MG/ML
0.62 INJECTION, SOLUTION INTRAMUSCULAR; INTRAVENOUS ONCE AS NEEDED
Status: DISCONTINUED | OUTPATIENT
Start: 2021-11-05 | End: 2021-11-05 | Stop reason: HOSPADM

## 2021-11-05 RX ORDER — OXYCODONE HYDROCHLORIDE AND ACETAMINOPHEN 5; 325 MG/1; MG/1
1 TABLET ORAL ONCE AS NEEDED
Status: COMPLETED | OUTPATIENT
Start: 2021-11-05 | End: 2021-11-05

## 2021-11-05 RX ORDER — SODIUM CHLORIDE 0.9 % (FLUSH) 0.9 %
10 SYRINGE (ML) INJECTION AS NEEDED
Status: DISCONTINUED | OUTPATIENT
Start: 2021-11-05 | End: 2021-11-05 | Stop reason: HOSPADM

## 2021-11-05 RX ORDER — OXYMETAZOLINE HYDROCHLORIDE 0.05 G/100ML
SPRAY NASAL AS NEEDED
Status: DISCONTINUED | OUTPATIENT
Start: 2021-11-05 | End: 2021-11-05 | Stop reason: HOSPADM

## 2021-11-05 RX ORDER — FENTANYL CITRATE 50 UG/ML
50 INJECTION, SOLUTION INTRAMUSCULAR; INTRAVENOUS
Status: DISCONTINUED | OUTPATIENT
Start: 2021-11-05 | End: 2021-11-05 | Stop reason: HOSPADM

## 2021-11-05 RX ORDER — FAMOTIDINE 10 MG/ML
INJECTION, SOLUTION INTRAVENOUS AS NEEDED
Status: DISCONTINUED | OUTPATIENT
Start: 2021-11-05 | End: 2021-11-05 | Stop reason: SURG

## 2021-11-05 RX ORDER — MAGNESIUM HYDROXIDE 1200 MG/15ML
LIQUID ORAL AS NEEDED
Status: DISCONTINUED | OUTPATIENT
Start: 2021-11-05 | End: 2021-11-05 | Stop reason: HOSPADM

## 2021-11-05 RX ORDER — FENTANYL CITRATE 50 UG/ML
INJECTION, SOLUTION INTRAMUSCULAR; INTRAVENOUS AS NEEDED
Status: DISCONTINUED | OUTPATIENT
Start: 2021-11-05 | End: 2021-11-05 | Stop reason: SURG

## 2021-11-05 RX ORDER — MIDAZOLAM HYDROCHLORIDE 1 MG/ML
1 INJECTION INTRAMUSCULAR; INTRAVENOUS
Status: DISCONTINUED | OUTPATIENT
Start: 2021-11-05 | End: 2021-11-05 | Stop reason: HOSPADM

## 2021-11-05 RX ORDER — SODIUM CHLORIDE, SODIUM LACTATE, POTASSIUM CHLORIDE, CALCIUM CHLORIDE 600; 310; 30; 20 MG/100ML; MG/100ML; MG/100ML; MG/100ML
100 INJECTION, SOLUTION INTRAVENOUS ONCE AS NEEDED
Status: DISCONTINUED | OUTPATIENT
Start: 2021-11-05 | End: 2021-11-05 | Stop reason: HOSPADM

## 2021-11-05 RX ADMIN — FENTANYL CITRATE 100 MCG: 50 INJECTION INTRAMUSCULAR; INTRAVENOUS at 09:22

## 2021-11-05 RX ADMIN — PROPOFOL 160 MG: 10 INJECTION, EMULSION INTRAVENOUS at 09:26

## 2021-11-05 RX ADMIN — FAMOTIDINE 20 MG: 10 INJECTION INTRAVENOUS at 09:22

## 2021-11-05 RX ADMIN — ROCURONIUM BROMIDE 30 MG: 10 SOLUTION INTRAVENOUS at 09:26

## 2021-11-05 RX ADMIN — ONDANSETRON 4 MG: 2 INJECTION INTRAMUSCULAR; INTRAVENOUS at 09:22

## 2021-11-05 RX ADMIN — GLYCOPYRROLATE 0.4 MG: 0.2 INJECTION, SOLUTION INTRAMUSCULAR; INTRAVENOUS at 11:04

## 2021-11-05 RX ADMIN — OXYCODONE HYDROCHLORIDE AND ACETAMINOPHEN 1 TABLET: 5; 325 TABLET ORAL at 12:03

## 2021-11-05 RX ADMIN — NEOSTIGMINE 3 MG: 1 INJECTION INTRAVENOUS at 11:04

## 2021-11-05 RX ADMIN — SODIUM CHLORIDE, POTASSIUM CHLORIDE, SODIUM LACTATE AND CALCIUM CHLORIDE: 600; 310; 30; 20 INJECTION, SOLUTION INTRAVENOUS at 07:34

## 2021-11-05 RX ADMIN — MIDAZOLAM 2 MG: 1 INJECTION INTRAMUSCULAR; INTRAVENOUS at 09:22

## 2021-11-05 NOTE — ANESTHESIA PROCEDURE NOTES
Airway  Urgency: elective    Date/Time: 11/5/2021 9:28 AM  End Time:11/5/2021 9:28 AM  Airway not difficult    General Information and Staff    Patient location during procedure: OR  CRNA: John Corley CRNA    Indications and Patient Condition  Indications for airway management: airway protection    Preoxygenated: yes  MILS maintained throughout  Mask difficulty assessment: 0 - not attempted    Final Airway Details  Final airway type: endotracheal airway      Successful airway: ETT  Cuffed: yes   Successful intubation technique: direct laryngoscopy  Endotracheal tube insertion site: oral  Blade: Christianne  Blade size: 4  ETT size (mm): 7.5  Cormack-Lehane Classification: grade IIa - partial view of glottis  Placement verified by: chest auscultation, capnometry and palpation of cuff   Cuff volume (mL): 8  Measured from: lips  ETT/EBT  to lips (cm): 22  Number of attempts at approach: 1  Assessment: lips, teeth, and gum same as pre-op and atraumatic intubation

## 2021-11-05 NOTE — ANESTHESIA PREPROCEDURE EVALUATION
Anesthesia Evaluation     no history of anesthetic complications:  NPO Solid Status: > 8 hours  NPO Liquid Status: > 8 hours           Airway   Mallampati: II  TM distance: >3 FB  Neck ROM: full  No difficulty expected  Dental    (+) poor dentition    Pulmonary    (+) sleep apnea,   Cardiovascular         Neuro/Psych  GI/Hepatic/Renal/Endo    (+)   hepatitis, liver disease,     Musculoskeletal     Abdominal    Substance History      OB/GYN          Other                        Anesthesia Plan    ASA 2     general     intravenous induction     Anesthetic plan, all risks, benefits, and alternatives have been provided, discussed and informed consent has been obtained with: patient.

## 2021-11-05 NOTE — H&P
Chief complaint:  Nasal Congestion   HPI: C/O 3-4 months of worsening allergies ; Zyrtec , Singular not consistent ; doesn't think sims has allergies has Flonase at home  ; PSG this year 12/2020 very mild         Review of Systems:               negative     History  Medical History        Past Medical History:   Diagnosis Date   • Infectious viral hepatitis           Surgical History         Past Surgical History:   Procedure Laterality Date   • TONSILLECTOMY AND ADENOIDECTOMY                   Family History   Problem Relation Age of Onset   • Hearing loss Father     • No Known Problems Mother        Social History            Tobacco Use   • Smoking status: Never Smoker   • Smokeless tobacco: Current User       Types: Chew   Substance Use Topics   • Alcohol use: No   • Drug use: No        Prescriptions Prior to Admission   (Not in a hospital admission)      Allergies:  Patient has no known allergies.        Objective []Expand by Default        Vitals  Ht 5'7  Wt 228        Physical Exam:                 General Appearance:    Alert, cooperative, in no acute distress   Head:    Normocephalic, without obvious abnormality, atraumatic   Eyes:            Lids and lashes normal, conjunctivae and sclerae normal, no   icterus, no pallor, corneas clear, PERRLA   Ears:    Ears appear intact with no abnormalities noted   Nose:     Throat:  Nasal septum- normal; Nasal Turbinates- 3+ Rt 2+ Lt ; Nasal  mucosa- edena    Tonsils absent ; positive gag    Neck:   No adenopathy, supple, trachea midline, no thyromegaly, no   carotid bruit, no JVD     Thyroid- normal   Lungs:     Clear to auscultation,respirations regular, even and                  unlabored    Heart:    Regular rhythm and normal rate, normal S1 and S2, no            murmur, no gallop, no rub, no click                                                                                   Assessment  AR   DNS   Nasal turbinates Hypertrophy   JOVITA        Plan  SEPTOPLASTY,  RESECTION INFERIOR TURBINATES                 Grzegorz Hagan MD  Chief complaint:  Nasal Congestion   HPI: C/O 3-4 months of worsening allergies ; Zyrtec , Singular not consistent ; doesn't think sims has allergies has Flonase at home  ; PSG this year 12/2020 very mild         Review of Systems:               negative     History  Medical History        Past Medical History:   Diagnosis Date   • Infectious viral hepatitis           Surgical History         Past Surgical History:   Procedure Laterality Date   • TONSILLECTOMY AND ADENOIDECTOMY                   Family History   Problem Relation Age of Onset   • Hearing loss Father     • No Known Problems Mother        Social History            Tobacco Use   • Smoking status: Never Smoker   • Smokeless tobacco: Current User       Types: Chew   Substance Use Topics   • Alcohol use: No   • Drug use: No        Prescriptions Prior to Admission   (Not in a hospital admission)      Allergies:  Patient has no known allergies.        Objective []Expand by Default        Vitals  Ht 5'7  Wt 228        Physical Exam:                 General Appearance:    Alert, cooperative, in no acute distress   Head:    Normocephalic, without obvious abnormality, atraumatic   Eyes:            Lids and lashes normal, conjunctivae and sclerae normal, no   icterus, no pallor, corneas clear, PERRLA   Ears:    Ears appear intact with no abnormalities noted   Nose:     Throat:  Nasal septum- normal; Nasal Turbinates- 3+ Rt 2+ Lt ; Nasal  mucosa- edena    Tonsils absent ; positive gag    Neck:   No adenopathy, supple, trachea midline, no thyromegaly, no   carotid bruit, no JVD     Thyroid- normal   Lungs:     Clear to auscultation,respirations regular, even and                  unlabored    Heart:    Regular rhythm and normal rate, normal S1 and S2, no            murmur, no gallop, no rub, no click                                                                                   Assessment  AR    DNS   Nasal turbinates Hypertrophy   JOVITA        Plan  SEPTOPLASTY, RESECTION INFERIOR TURBINATES                 Grzegorz Hagan MD

## 2021-11-05 NOTE — ANESTHESIA POSTPROCEDURE EVALUATION
Patient: Thien Covarrubias    Procedure Summary     Date: 11/05/21 Room / Location: [facility] OR 09 /  [facility] OR    Anesthesia Start: 0921 Anesthesia Stop: 1119    Procedure: SEPTOPLASTY, RESECTION INFERIOR TURBINATES (Bilateral Nose) Diagnosis:       Allergic rhinitis due to pollen      Deviated nasal septum      Hypertrophy of nasal turbinates      Obstructive sleep apnea (adult) (pediatric)      (Allergic rhinitis due to pollen [J30.1])      (Deviated nasal septum [J34.2])      (Hypertrophy of nasal turbinates [J34.3])      (Obstructive sleep apnea (adult) (pediatric) [G47.33])    Surgeons: Grzegorz Hagan MD Provider: Claude Saunders MD    Anesthesia Type: general ASA Status: 2          Anesthesia Type: general    Vitals  Vitals Value Taken Time   /79 11/05/21 1120   Temp 97.6 °F (36.4 °C) 11/05/21 1120   Pulse 95 11/05/21 1120   Resp 10 11/05/21 1120   SpO2 100 % 11/05/21 1120           Post Anesthesia Care and Evaluation    Patient location during evaluation: PACU  Patient participation: complete - patient participated  Level of consciousness: awake  Pain score: 0  Pain management: adequate  Airway patency: patent  Anesthetic complications: No anesthetic complications  PONV Status: none  Cardiovascular status: acceptable  Respiratory status: acceptable  Hydration status: acceptable

## 2021-11-05 NOTE — OP NOTE
Pre-op Diagnosis:   Allergic rhinitis due to pollen [J30.1]  Deviated nasal septum [J34.2]  Nasal congestion [R09.81]  Hypertrophy of nasal turbinates [J34.3]    Post-op Diagnosis:     Post-Op Diagnosis Codes:     * Allergic rhinitis due to pollen [J30.1]     * Deviated nasal septum [J34.2]     * Hypertrophy of nasal turbinates [J34.3]    Procedure(s):  SEPTOPLASTY  submucous RESECTION INFERIOR TURBINATES,    Surgeon(s):  Grzegorz Hagan MD    Anesthesia: Anesthesia type not filed in the log.    Estimated Blood Loss: 10 ml    Specimens:   none             Findings:   1. Severe deviated septum to left  2. Thick bony posterior septal deviation    Condition: Stable.      Complications: None    Implants/Grafts: None    DESCRIPTION OF PROCEDURE: The patient was brought to the operating room and transferred to the operating table in the supine position. General endotracheal anesthesia was initiated. The patient was anesthetized. Surgical pause was taken and the patient's name, identification, and the procedure to be performed were verified by the operating staff and anesthesiologist. Universal protocol was maintained. Consent was verified. The head of the bed was rotated 90º away from the anesthesiologist towards the surgeon. Head of bed was elevated at 30°. Throat pack was placed in the oral cavity and oropharynx. Then 2.5 loupe magnification was used throughout the entirety of the case. The patient was prepped and draped in usual fashion. Initially 1% lidocaine with epinephrine 1:100,000 was injected in the bilateral septal flaps and inferior turbinates bilaterally. A total of 10 mL was used for the case. I made a vertical incision with a left hemitransfixion incision down to the caudal end of the cartilaginous septum. I raised the left anterior tunnel and connected this to the left posterior tunnel. I then raised the right anterior tunnel and connected this to the right posterior tunnel.  I made a vertical incision  anterior to the deviated portion of the cartilaginous septum which was at least 1 cm posterior to the caudal end of the septum. An inferior strip of cartilage was removed. The Gallegos Bynum-Lenora double action scissors were used to make cuts above and below the deviated portion of the bony septum. I used Doc-Cooley double action rongeurs to remove the bony deviated septum. I then  the decussating fibers of the maxillary crest on both sides. I then used double action rongeurs to remove the deviation of the maxillary crest and premaxilla.As the inferior cartilage strip was removed it allowed the cartilage to move in swinging door fashion and was secured to the remaining maxillary crest. Any removed cartilage was crushed and was placed between the septal flaps.I then closed the left hemitransfixion incision with 4-0 chromic in interrupted fashion. I then used a 4-0 chromic whipstitch to reapproximate the septal flaps.    A zero degree endoscope was used to inject 1% lidocaine with epinephrine 1/100,000 into the left axilla middle turbinate, uncinate process, and sphenopalatine area. Afrin pledgets were placed and allowed to take effect. I used a freer to medialize the middle turbinate against the septum.     I then inspected the inferior turbinates. I used the [place] to outfracture the inferior turbinates as they were very enlarged. I made a vertical incision at the anterior head of the inferior turbinate on each side with a #15 blade scalpel measuring 3 mm. I used a freer to elevate the mucosa off the bony turbinate. I then used a 2.0 mm [place] inferior turbinate blade with a 0° endoscope to perform a submucous resection of the left inferior turbinate for the entire length of the turbinate. There was significant reduction in the mucosal hypertrophy. The remaining inferior turbinate was lateralized with the [place]. I repeated this on the right inferior turbinate and performed a submucous resection  of the right inferior turbinate.      I placed Nasopore between the septum and head of the inferior turbinate bilaterally. There was good hemostasis. The throat pack was removed. The patient was handed back to the anesthesiologist, extubated in the operating room, and transferred to the postoperative recovery room in stable condition. There were no complications.

## 2022-08-17 ENCOUNTER — HOSPITAL ENCOUNTER (OUTPATIENT)
Dept: GENERAL RADIOLOGY | Facility: HOSPITAL | Age: 27
Discharge: HOME | End: 2022-08-17

## 2022-08-17 ENCOUNTER — HOSPITAL ENCOUNTER (OUTPATIENT)
Dept: ULTRASOUND IMAGING | Facility: HOSPITAL | Age: 27
Discharge: HOME | End: 2022-08-17

## 2022-08-17 ENCOUNTER — LAB (OUTPATIENT)
Dept: LAB | Facility: HOSPITAL | Age: 27
End: 2022-08-17

## 2022-08-17 ENCOUNTER — DISEASE STATE MANAGEMENT VISIT (OUTPATIENT)
Dept: PHARMACY | Facility: HOSPITAL | Age: 27
End: 2022-08-17

## 2022-08-17 VITALS
DIASTOLIC BLOOD PRESSURE: 93 MMHG | HEART RATE: 95 BPM | HEIGHT: 66 IN | BODY MASS INDEX: 35.26 KG/M2 | SYSTOLIC BLOOD PRESSURE: 148 MMHG | WEIGHT: 219.4 LBS

## 2022-08-17 DIAGNOSIS — B18.2 CHRONIC HEPATITIS C WITHOUT HEPATIC COMA: Primary | ICD-10-CM

## 2022-08-17 DIAGNOSIS — B18.2 CHRONIC HEPATITIS C WITHOUT HEPATIC COMA: ICD-10-CM

## 2022-08-17 LAB
ALBUMIN SERPL-MCNC: 4.7 G/DL (ref 3.5–5.2)
ALBUMIN/GLOB SERPL: 2.1 G/DL
ALP SERPL-CCNC: 71 U/L (ref 39–117)
ALPHA-FETOPROTEIN: 2.48 NG/ML (ref 0–8.3)
ALT SERPL W P-5'-P-CCNC: 23 U/L (ref 1–41)
AMPHET+METHAMPHET UR QL: NEGATIVE
AMPHETAMINES UR QL: NEGATIVE
ANION GAP SERPL CALCULATED.3IONS-SCNC: 8.4 MMOL/L (ref 5–15)
AST SERPL-CCNC: 15 U/L (ref 1–40)
BARBITURATES UR QL SCN: NEGATIVE
BASOPHILS # BLD AUTO: 0.03 10*3/MM3 (ref 0–0.2)
BASOPHILS NFR BLD AUTO: 0.3 % (ref 0–1.5)
BENZODIAZ UR QL SCN: NEGATIVE
BILIRUB SERPL-MCNC: 0.3 MG/DL (ref 0–1.2)
BUN SERPL-MCNC: 10 MG/DL (ref 6–20)
BUN/CREAT SERPL: 14.5 (ref 7–25)
BUPRENORPHINE SERPL-MCNC: NEGATIVE NG/ML
CALCIUM SPEC-SCNC: 9.5 MG/DL (ref 8.6–10.5)
CANNABINOIDS SERPL QL: NEGATIVE
CHLORIDE SERPL-SCNC: 102 MMOL/L (ref 98–107)
CO2 SERPL-SCNC: 26.6 MMOL/L (ref 22–29)
COCAINE UR QL: NEGATIVE
CREAT SERPL-MCNC: 0.69 MG/DL (ref 0.76–1.27)
DEPRECATED RDW RBC AUTO: 37 FL (ref 37–54)
EGFRCR SERPLBLD CKD-EPI 2021: 128.5 ML/MIN/1.73
EOSINOPHIL # BLD AUTO: 0.27 10*3/MM3 (ref 0–0.4)
EOSINOPHIL NFR BLD AUTO: 2.9 % (ref 0.3–6.2)
ERYTHROCYTE [DISTWIDTH] IN BLOOD BY AUTOMATED COUNT: 11.7 % (ref 12.3–15.4)
GLOBULIN UR ELPH-MCNC: 2.2 GM/DL
GLUCOSE SERPL-MCNC: 100 MG/DL (ref 65–99)
HBV SURFACE AB SER RIA-ACNC: NORMAL
HBV SURFACE AG SERPL QL IA: NORMAL
HCT VFR BLD AUTO: 45.7 % (ref 37.5–51)
HGB BLD-MCNC: 15.4 G/DL (ref 13–17.7)
HIV1+2 AB SER QL: NORMAL
IMM GRANULOCYTES # BLD AUTO: 0.06 10*3/MM3 (ref 0–0.05)
IMM GRANULOCYTES NFR BLD AUTO: 0.6 % (ref 0–0.5)
INR PPP: 1.03 (ref 0.9–1.1)
LYMPHOCYTES # BLD AUTO: 1.83 10*3/MM3 (ref 0.7–3.1)
LYMPHOCYTES NFR BLD AUTO: 19.5 % (ref 19.6–45.3)
MCH RBC QN AUTO: 29.2 PG (ref 26.6–33)
MCHC RBC AUTO-ENTMCNC: 33.7 G/DL (ref 31.5–35.7)
MCV RBC AUTO: 86.7 FL (ref 79–97)
METHADONE UR QL SCN: NEGATIVE
MONOCYTES # BLD AUTO: 0.9 10*3/MM3 (ref 0.1–0.9)
MONOCYTES NFR BLD AUTO: 9.6 % (ref 5–12)
NEUTROPHILS NFR BLD AUTO: 6.3 10*3/MM3 (ref 1.7–7)
NEUTROPHILS NFR BLD AUTO: 67.1 % (ref 42.7–76)
NRBC BLD AUTO-RTO: 0 /100 WBC (ref 0–0.2)
OPIATES UR QL: NEGATIVE
OXYCODONE UR QL SCN: NEGATIVE
PCP UR QL SCN: NEGATIVE
PLATELET # BLD AUTO: 242 10*3/MM3 (ref 140–450)
PMV BLD AUTO: 11 FL (ref 6–12)
POTASSIUM SERPL-SCNC: 3.9 MMOL/L (ref 3.5–5.2)
PROPOXYPH UR QL: NEGATIVE
PROT SERPL-MCNC: 6.9 G/DL (ref 6–8.5)
PROTHROMBIN TIME: 13.7 SECONDS (ref 12.1–14.7)
RBC # BLD AUTO: 5.27 10*6/MM3 (ref 4.14–5.8)
SODIUM SERPL-SCNC: 137 MMOL/L (ref 136–145)
TRICYCLICS UR QL SCN: NEGATIVE
WBC NRBC COR # BLD: 9.39 10*3/MM3 (ref 3.4–10.8)

## 2022-08-17 PROCEDURE — 87522 HEPATITIS C REVRS TRNSCRPJ: CPT

## 2022-08-17 PROCEDURE — G0432 EIA HIV-1/HIV-2 SCREEN: HCPCS

## 2022-08-17 PROCEDURE — 71046 X-RAY EXAM CHEST 2 VIEWS: CPT | Performed by: RADIOLOGY

## 2022-08-17 PROCEDURE — 82105 ALPHA-FETOPROTEIN SERUM: CPT

## 2022-08-17 PROCEDURE — 85025 COMPLETE CBC W/AUTO DIFF WBC: CPT

## 2022-08-17 PROCEDURE — 86704 HEP B CORE ANTIBODY TOTAL: CPT

## 2022-08-17 PROCEDURE — 76705 ECHO EXAM OF ABDOMEN: CPT | Performed by: RADIOLOGY

## 2022-08-17 PROCEDURE — 99213 OFFICE O/P EST LOW 20 MIN: CPT | Performed by: PHYSICIAN ASSISTANT

## 2022-08-17 PROCEDURE — 86708 HEPATITIS A ANTIBODY: CPT

## 2022-08-17 PROCEDURE — 87340 HEPATITIS B SURFACE AG IA: CPT

## 2022-08-17 PROCEDURE — 80306 DRUG TEST PRSMV INSTRMNT: CPT

## 2022-08-17 PROCEDURE — 85610 PROTHROMBIN TIME: CPT

## 2022-08-17 PROCEDURE — 76705 ECHO EXAM OF ABDOMEN: CPT

## 2022-08-17 PROCEDURE — 86706 HEP B SURFACE ANTIBODY: CPT

## 2022-08-17 PROCEDURE — 71046 X-RAY EXAM CHEST 2 VIEWS: CPT

## 2022-08-17 PROCEDURE — 80053 COMPREHEN METABOLIC PANEL: CPT

## 2022-08-17 PROCEDURE — 81596 NFCT DS CHRNC HCV 6 ASSAYS: CPT

## 2022-08-17 NOTE — PROGRESS NOTES
Chief Complaint   Patient presents with   • Hepatitis C     Thien Covarrubias is a 27 y.o. male who presents to the office today at the request of Oli PATTERSON MD for Hepatitis C.    HPI  He found out about having Hepatitis C infection approx 2 days ago. He has had prior treatment for hepatitis 2 years ago.  He completed Epclusa treatment but did not come back for the 12 week post treatment visit so he did not do final labs to see if HCV had been eradicated.  Patient reports that a provider tested him and he still has a viral load.  Reports no known personal history of liver disease including other viral hepatitis. There is no known family history of his father having alcoholic cirrhosis. He admits to previous IVDU and intranasal drug use. He does have nonprofessional tattoos. Denies having previous alcoholism. He does not currently drink alcohol. He denies current illicit drug use including marijuana. No recent liver imaging. He has had recent labs. He has not had previous vaccinations for Hepatitis A and B. He is currently part time job doing work at [employer] work. The patient lacks significant family support.       Review of Systems   Constitutional: Negative for activity change, appetite change and fatigue.   HENT: Negative for trouble swallowing and voice change.    Respiratory: Negative for cough and choking.         Pain with inhalation   Cardiovascular: Negative for leg swelling.   Gastrointestinal: Negative for abdominal distention, abdominal pain, anal bleeding, blood in stool, constipation, diarrhea, nausea, rectal pain and vomiting.   Endocrine: Negative for polyphagia.   Genitourinary: Negative for flank pain.   Musculoskeletal: Negative for back pain.   Skin: Negative for color change and pallor.   Allergic/Immunologic: Negative for food allergies.   Neurological: Negative for weakness.   Psychiatric/Behavioral: Negative for confusion. The patient is nervous/anxious.        ACTIVE PROBLEMS:  "  Specialty Problems    None         PAST MEDICAL HISTORY:  Past Medical History:   Diagnosis Date   • Infectious viral hepatitis    • Nasal congestion    • Sleep apnea     MINOR PER PATIENT   • Snores    • Strabismus        SURGICAL HISTORY:  Past Surgical History:   Procedure Laterality Date   • EAR TUBES     • EYE SURGERY     • SEPTOPLASTY, RESECTION INFERIOR TURBINATES Bilateral 11/5/2021    Procedure: SEPTOPLASTY, RESECTION INFERIOR TURBINATES;  Surgeon: Grzegorz Hagan MD;  Location: [facility];  Service: ENT;  Laterality: Bilateral;   • TONSILLECTOMY AND ADENOIDECTOMY     • VASECTOMY     • WISDOM TOOTH EXTRACTION         FAMILY HISTORY:  Family History   Problem Relation Age of Onset   • Hearing loss Father    • No Known Problems Mother        SOCIAL HISTORY:  Social History     Tobacco Use   • Smoking status: Former Smoker   • Smokeless tobacco: Former User     Types: Chew   Substance Use Topics   • Alcohol use: No       CURRENT MEDICATION:  No current outpatient medications on file.    ALLERGIES:  Patient has no known allergies.    VISIT VITALS:  Blood Pressure 148/93   Pulse 95   Height 167.6 cm (66\")   Weight 99.5 kg (219 lb 6.4 oz)   Body Mass Index 35.41 kg/m²     PHYSICAL EXAMINATION:  Physical Exam  Constitutional:       General: He is not in acute distress.     Appearance: He is well-developed. He is not diaphoretic.   HENT:      Head: Normocephalic and atraumatic.      Right Ear: External ear normal.      Left Ear: External ear normal.      Nose: Nose normal.      Mouth/Throat:      Pharynx: No oropharyngeal exudate.   Eyes:      General: No scleral icterus.        Right eye: No discharge.         Left eye: No discharge.      Conjunctiva/sclera: Conjunctivae normal.      Pupils: Pupils are equal, round, and reactive to light.   Neck:      Thyroid: No thyromegaly.      Vascular: No JVD.      Trachea: No tracheal deviation.   Cardiovascular:      Rate and Rhythm: Normal rate and regular rhythm. "      Heart sounds: Normal heart sounds. No murmur heard.    No friction rub. No gallop.   Pulmonary:      Effort: Pulmonary effort is normal. No respiratory distress.      Breath sounds: Normal breath sounds. No stridor. No wheezing or rales.   Chest:      Chest wall: No tenderness.   Abdominal:      General: Bowel sounds are normal. There is no distension.      Palpations: Abdomen is soft. There is no mass.      Tenderness: There is abdominal tenderness (epigastric). There is no guarding or rebound.      Hernia: No hernia is present.   Genitourinary:     Rectum: Guaiac result negative.   Musculoskeletal:      Cervical back: Normal range of motion and neck supple.   Lymphadenopathy:      Cervical: No cervical adenopathy.   Skin:     General: Skin is warm and dry.      Coloration: Skin is not pale.      Findings: No erythema or rash.   Neurological:      Mental Status: He is alert and oriented to person, place, and time.      Cranial Nerves: No cranial nerve deficit.      Motor: No abnormal muscle tone.      Coordination: Coordination normal.      Deep Tendon Reflexes: Reflexes are normal and symmetric. Reflexes normal.   Psychiatric:         Behavior: Behavior normal.         Thought Content: Thought content normal.         Judgment: Judgment normal.      Comments: Mild anxiousness         Assessment & Plan      Diagnosis Plan   1. Chronic hepatitis C without hepatic coma (HCC)  AFP Tumor Marker    CBC & Differential    Comprehensive Metabolic Panel    HCV FibroSURE    HCV RNA By PCR, Qn Rfx Kae    Hepatitis A Antibody, Total    Hepatitis B Core Antibody, Total    Hepatitis B Surface Antibody    Hepatitis B Surface Antigen    HIV-1 & HIV-2 Antibodies    Protime-INR    Urine Drug Screen - Urine, Clean Catch    US Liver    XR Chest 2 View     The patient will complete initial lab work and liver US in order to begin Hepatitis C treatment.  The patient will return in two weeks to discuss results and begin treatment if  warranted.    Return in about 2 weeks (around 8/31/2022) for Recheck.           DEVON Armendariz

## 2022-08-18 LAB
HAV AB SER QL IA: POSITIVE
HBV CORE AB SERPL QL IA: NEGATIVE

## 2022-08-19 LAB
A2 MACROGLOB SERPL-MCNC: 131 MG/DL (ref 110–276)
ALT SERPL W P-5'-P-CCNC: 27 IU/L (ref 0–55)
APO A-I SERPL-MCNC: 131 MG/DL (ref 101–178)
BILIRUB SERPL-MCNC: 0.2 MG/DL (ref 0–1.2)
FIBROSIS SCORING:: NORMAL
FIBROSIS STAGE SERPL QL: NORMAL
GGT SERPL-CCNC: 19 IU/L (ref 0–65)
HAPTOGLOB SERPL-MCNC: 132 MG/DL (ref 17–317)
HCV AB SER QL: NORMAL
HCV GENTYP SERPL NAA+PROBE: NORMAL
HCV RNA SERPL NAA+PROBE-ACNC: NORMAL IU/ML
HCV RNA SERPL NAA+PROBE-LOG IU: NORMAL {LOG_IU}/ML
LABORATORY COMMENT REPORT: NORMAL
LABORATORY COMMENT REPORT: NORMAL
LIVER FIBR SCORE SERPL CALC.FIBROSURE: 0.03 (ref 0–0.21)
NECROINFLAMM ACTIVITY SCORING:: NORMAL
NECROINFLAMMATORY ACT GRADE SERPL QL: NORMAL
NECROINFLAMMATORY ACT SCORE SERPL: 0.09 (ref 0–0.17)
SERVICE CMNT-IMP: NORMAL

## 2022-08-31 ENCOUNTER — APPOINTMENT (OUTPATIENT)
Dept: PHARMACY | Facility: HOSPITAL | Age: 27
End: 2022-08-31

## 2022-08-31 ENCOUNTER — DISEASE STATE MANAGEMENT VISIT (OUTPATIENT)
Dept: PHARMACY | Facility: HOSPITAL | Age: 27
End: 2022-08-31

## 2022-08-31 VITALS
SYSTOLIC BLOOD PRESSURE: 131 MMHG | HEART RATE: 60 BPM | DIASTOLIC BLOOD PRESSURE: 82 MMHG | WEIGHT: 221 LBS | HEIGHT: 66 IN | BODY MASS INDEX: 35.52 KG/M2

## 2022-08-31 DIAGNOSIS — R76.8 HEPATITIS C ANTIBODY POSITIVE IN BLOOD: Primary | ICD-10-CM

## 2022-08-31 PROCEDURE — 99213 OFFICE O/P EST LOW 20 MIN: CPT | Performed by: PHYSICIAN ASSISTANT

## 2022-08-31 NOTE — PROGRESS NOTES
Chief Complaint   Patient presents with   • Hepatitis C       Thien Covarrubias is a 27 y.o. male who presents to the office today for follow up appointment for Hepatitis C  .    HPI    The patient was seen for a follow up visit for HepC.  He completed initial testing for treatment.  Liver US: heterogeneous echotexture of the liver  Labs:  Urine drug screen negative;  PT/INR normal; Hep B surface antibody and antigen and core antibody levels negative.  Hep A antibody level positive; HCV not detected;  Fibrosure normal so no fibrosis.  Platelets normal.  AFP tumor marker negative.  Liver chemistries normal.       Review of Systems   Constitutional: Negative for activity change, appetite change and fatigue.   HENT: Negative for trouble swallowing and voice change.    Respiratory: Negative for cough and choking.    Cardiovascular: Negative for leg swelling.   Gastrointestinal: Negative for abdominal distention, abdominal pain, anal bleeding, blood in stool, constipation, diarrhea, nausea, rectal pain and vomiting.   Endocrine: Negative for polyphagia.   Genitourinary: Negative for flank pain.   Musculoskeletal: Negative for back pain.   Skin: Negative for color change and pallor.   Allergic/Immunologic: Negative for food allergies.   Neurological: Negative for weakness.   Psychiatric/Behavioral: Negative for confusion.       ACTIVE PROBLEMS:   Specialty Problems    None         PAST MEDICAL HISTORY:  Past Medical History:   Diagnosis Date   • Infectious viral hepatitis    • Nasal congestion    • Sleep apnea     MINOR PER PATIENT   • Snores    • Strabismus        SURGICAL HISTORY:  Past Surgical History:   Procedure Laterality Date   • EAR TUBES     • EYE SURGERY     • SEPTOPLASTY, RESECTION INFERIOR TURBINATES Bilateral 11/5/2021    Procedure: SEPTOPLASTY, RESECTION INFERIOR TURBINATES;  Surgeon: Grzegorz Hagan MD;  Location: [facility];  Service: ENT;  Laterality: Bilateral;   • TONSILLECTOMY AND ADENOIDECTOMY     •  "VASECTOMY     • WISDOM TOOTH EXTRACTION         FAMILY HISTORY:  Family History   Problem Relation Age of Onset   • Hearing loss Father    • No Known Problems Mother        SOCIAL HISTORY:  Social History     Tobacco Use   • Smoking status: Former Smoker   • Smokeless tobacco: Former User     Types: Chew   Substance Use Topics   • Alcohol use: No       CURRENT MEDICATION:  No current outpatient medications on file.    ALLERGIES:  Patient has no known allergies.    VISIT VITALS:  Blood Pressure 131/82   Pulse 60   Height 167.6 cm (66\")   Weight 100 kg (221 lb)   Body Mass Index 35.67 kg/m²     Physical Exam  Constitutional:       General: He is not in acute distress.     Appearance: He is well-developed. He is not diaphoretic.   HENT:      Head: Normocephalic and atraumatic.      Right Ear: External ear normal.      Left Ear: External ear normal.      Nose: Nose normal.      Mouth/Throat:      Pharynx: No oropharyngeal exudate.   Eyes:      General: No scleral icterus.        Right eye: No discharge.         Left eye: No discharge.      Conjunctiva/sclera: Conjunctivae normal.      Pupils: Pupils are equal, round, and reactive to light.   Neck:      Thyroid: No thyromegaly.      Vascular: No JVD.      Trachea: No tracheal deviation.   Cardiovascular:      Rate and Rhythm: Normal rate and regular rhythm.      Heart sounds: Normal heart sounds. No murmur heard.    No friction rub. No gallop.   Pulmonary:      Effort: Pulmonary effort is normal. No respiratory distress.      Breath sounds: Normal breath sounds. No stridor. No wheezing or rales.   Chest:      Chest wall: No tenderness.   Abdominal:      General: Bowel sounds are normal. There is no distension.      Palpations: Abdomen is soft. There is no mass.      Tenderness: There is no abdominal tenderness. There is no guarding or rebound.      Hernia: No hernia is present.   Genitourinary:     Rectum: Guaiac result negative.   Musculoskeletal:      Cervical " back: Normal range of motion and neck supple.   Lymphadenopathy:      Cervical: No cervical adenopathy.   Skin:     General: Skin is warm and dry.      Coloration: Skin is not pale.      Findings: No erythema or rash.   Neurological:      Mental Status: He is alert and oriented to person, place, and time.      Cranial Nerves: No cranial nerve deficit.      Motor: No abnormal muscle tone.      Coordination: Coordination normal.      Deep Tendon Reflexes: Reflexes are normal and symmetric. Reflexes normal.   Psychiatric:         Behavior: Behavior normal.         Thought Content: Thought content normal.         Judgment: Judgment normal.         Assessment & Plan      Diagnosis Plan   1. Hepatitis C antibody positive in blood       The patient does not have a Hep C infection.  He was educated that he will always show positive for Hep C antibodies.  He was also educated that he can acquire a new infection by contact with an infected individual's blood.  He voiced understanding and agreement.            DEVON Armendariz

## (undated) DEVICE — ST. VAGINAL PACKING X-RAY DETECTABLE: Brand: DEROYAL

## (undated) DEVICE — KT ANTI FOG W/FLD AND SPNG

## (undated) DEVICE — GLV SURG PREMIERPRO MIC LTX PF SZ7.5 BRN

## (undated) DEVICE — PATIENT RETURN ELECTRODE, SINGLE-USE, CONTACT QUALITY MONITORING, ADULT, WITH 9FT CORD, FOR PATIENTS WEIGING OVER 33LBS. (15KG): Brand: MEGADYNE

## (undated) DEVICE — PACKING 8004006 NEURAY 200PK 13X51MM: Brand: NEURAY ®

## (undated) DEVICE — SPNG GZ WOVN 4X4IN 12PLY 10/BX STRL

## (undated) DEVICE — INTENDED FOR TISSUE SEPARATION, AND OTHER PROCEDURES THAT REQUIRE A SHARP SURGICAL BLADE TO PUNCTURE OR CUT.: Brand: BARD-PARKER ® STAINLESS STEEL BLADES

## (undated) DEVICE — MEDI-VAC NON-CONDUCTIVE SUCTION TUBING 6MM X 6.1M (20 FT.) L: Brand: CARDINAL HEALTH

## (undated) DEVICE — HOLDER: Brand: DEROYAL

## (undated) DEVICE — CUFF SCD HEMOFORCE SEQ CALF STD MD

## (undated) DEVICE — BLADE 1882040HR 5PK M4 INF TURB 2MM ROT: Brand: STRAIGHTSHOT

## (undated) DEVICE — PK ENT 70

## (undated) DEVICE — SUT GUT CHRM 4/0 FS2 27IN 635H

## (undated) DEVICE — SUT NLY 2/0 664G